# Patient Record
Sex: FEMALE | Race: BLACK OR AFRICAN AMERICAN | NOT HISPANIC OR LATINO | Employment: FULL TIME | URBAN - METROPOLITAN AREA
[De-identification: names, ages, dates, MRNs, and addresses within clinical notes are randomized per-mention and may not be internally consistent; named-entity substitution may affect disease eponyms.]

---

## 2017-01-03 ENCOUNTER — CLINICAL SUPPORT (OUTPATIENT)
Dept: OBSTETRICS AND GYNECOLOGY | Facility: CLINIC | Age: 32
End: 2017-01-03
Payer: MEDICAID

## 2017-01-03 DIAGNOSIS — Z30.42 DEPO-PROVERA CONTRACEPTIVE STATUS: Primary | ICD-10-CM

## 2017-01-03 PROCEDURE — 99999 PR PBB SHADOW E&M-NEW PATIENT-LVL I: CPT | Mod: PBBFAC,,,

## 2017-01-03 PROCEDURE — 96372 THER/PROPH/DIAG INJ SC/IM: CPT | Mod: PBBFAC,PN

## 2017-01-03 PROCEDURE — 99201 *HC E&M-NEW PATIENT-LVL I: CPT | Mod: PBBFAC,PN

## 2017-01-03 RX ORDER — MEDROXYPROGESTERONE ACETATE 150 MG/ML
150 INJECTION, SUSPENSION INTRAMUSCULAR ONCE
Status: COMPLETED | OUTPATIENT
Start: 2017-01-03 | End: 2017-01-03

## 2017-01-03 RX ADMIN — MEDROXYPROGESTERONE ACETATE 150 MG: 150 INJECTION, SUSPENSION INTRAMUSCULAR at 10:01

## 2017-01-03 NOTE — PROGRESS NOTES
Per Dr. Pérez' records, patient's last injection and annual was 10/11/16.  Depo Provera 150mg given IM without difficulty.  Patient tolerated well.  Patient instructed to remain in clinic for 15 minutes following injection.  Appointment given for next injection and AVS handed to patient.  Patient verbalized understanding of instructions.

## 2017-01-03 NOTE — MR AVS SNAPSHOT
[facility] Obstetrics and Gynecology  [address]  [place] [address]  Phone: [phone]                  Missy Grayson   1/3/2017 10:00 AM   Clinical Support    Description:  Female : 1985   Provider:  OB GYN NURSEMAYNOR   Department:  [facility] Obstetrics and Gynecology           Reason for Visit     Injections                To Do List           Future Appointments        Provider Department Dept Phone    1/3/2017 10:00 AM OB GYN NURSE, [facility] Obstetrics and Gynecology [phone]    3/28/2017 8:30 AM OB GYN NURSE, [facility] Obstetrics [facility] Gynecology [phone]      Goals (5 Years of Data)     None      [facility] On [facility]     [facility] Line -  Assistance  Registered nurses in the [facility] Center provide clinical advisement, health education, appointment booking, and other advisory services.  Call for this free service at [phone].             Medications           Message regarding Medications     Verify the changes and/or additions to your medication regime listed below are the same as discussed with your clinician today.  If any of these changes or additions are incorrect, please notify your healthcare provider.             Verify that the below list of medications is an accurate representation of the medications you are currently taking.  If none reported, the list may be blank. If incorrect, please contact your healthcare provider. Carry this list with you in case of emergency.                Clinical Reference Information           Allergies as of 1/3/2017     No Known Allergies      Immunizations Administered on Date of Encounter - 1/3/2017     None      [facility] Sign-Up     Activating your [facility] account is as easy as 1-2-3!     1) Visit [email or web address], select Sign Up Now, enter this activation code and your date of birth, then select Next.  [email or web address]  Expires: 2017  9:16 AM      2) Create a username and password  to use when you visit [facility] in the future and select a security question in case you lose your password and select Next.    3) Enter your e-mail address and click Sign Up!    Additional Information  If you have questions, please e-mail [email or web address] or call [phone] to talk to our [facility] staff. Remember, [facility] is NOT to be used for urgent needs. For medical emergencies, dial 911.

## 2017-03-28 ENCOUNTER — CLINICAL SUPPORT (OUTPATIENT)
Dept: OBSTETRICS AND GYNECOLOGY | Facility: CLINIC | Age: 32
End: 2017-03-28
Payer: MEDICAID

## 2017-03-28 DIAGNOSIS — Z30.42 ENCOUNTER FOR SURVEILLANCE OF INJECTABLE CONTRACEPTIVE: Primary | ICD-10-CM

## 2017-03-28 PROCEDURE — 96372 THER/PROPH/DIAG INJ SC/IM: CPT | Mod: PBBFAC,PN

## 2017-03-28 RX ORDER — MEDROXYPROGESTERONE ACETATE 150 MG/ML
150 INJECTION, SUSPENSION INTRAMUSCULAR
Status: SHIPPED | OUTPATIENT
Start: 2017-03-28 | End: 2018-06-21

## 2017-03-28 RX ADMIN — MEDROXYPROGESTERONE ACETATE 150 MG: 150 INJECTION, SUSPENSION INTRAMUSCULAR at 10:03

## 2017-03-28 NOTE — PROGRESS NOTES
Depo-Provera 150mg given IM as ordered by . Pt tolerated injection well. Nurse visit is 12 weeks scheduled.

## 2017-04-13 ENCOUNTER — LAB VISIT (OUTPATIENT)
Dept: LAB | Facility: HOSPITAL | Age: 32
End: 2017-04-13
Attending: OBSTETRICS & GYNECOLOGY
Payer: MEDICAID

## 2017-04-13 ENCOUNTER — OFFICE VISIT (OUTPATIENT)
Dept: OBSTETRICS AND GYNECOLOGY | Facility: CLINIC | Age: 32
End: 2017-04-13
Payer: MEDICAID

## 2017-04-13 VITALS — BODY MASS INDEX: 45.99 KG/M2 | WEIGHT: 293 LBS | HEIGHT: 67 IN

## 2017-04-13 DIAGNOSIS — R53.82 CHRONIC FATIGUE: ICD-10-CM

## 2017-04-13 DIAGNOSIS — Z12.4 SCREENING FOR CERVICAL CANCER: ICD-10-CM

## 2017-04-13 DIAGNOSIS — Z01.419 ENCOUNTER FOR GYNECOLOGICAL EXAMINATION (GENERAL) (ROUTINE) WITHOUT ABNORMAL FINDINGS: Primary | ICD-10-CM

## 2017-04-13 LAB
25(OH)D3+25(OH)D2 SERPL-MCNC: 14 NG/ML
ALBUMIN SERPL BCP-MCNC: 3.3 G/DL
ALP SERPL-CCNC: 52 U/L
ALT SERPL W/O P-5'-P-CCNC: 22 U/L
ANION GAP SERPL CALC-SCNC: 12 MMOL/L
AST SERPL-CCNC: 21 U/L
BILIRUB SERPL-MCNC: 0.5 MG/DL
BUN SERPL-MCNC: 10 MG/DL
CALCIUM SERPL-MCNC: 9.3 MG/DL
CHLORIDE SERPL-SCNC: 110 MMOL/L
CO2 SERPL-SCNC: 24 MMOL/L
CREAT SERPL-MCNC: 0.8 MG/DL
ERYTHROCYTE [DISTWIDTH] IN BLOOD BY AUTOMATED COUNT: 17.1 %
EST. GFR  (AFRICAN AMERICAN): >60 ML/MIN/1.73 M^2
EST. GFR  (NON AFRICAN AMERICAN): >60 ML/MIN/1.73 M^2
GLUCOSE SERPL-MCNC: 122 MG/DL
HCT VFR BLD AUTO: 42.7 %
HGB BLD-MCNC: 13.5 G/DL
MCH RBC QN AUTO: 24.2 PG
MCHC RBC AUTO-ENTMCNC: 31.6 %
MCV RBC AUTO: 77 FL
PLATELET # BLD AUTO: 289 K/UL
PMV BLD AUTO: 10.5 FL
POTASSIUM SERPL-SCNC: 3.5 MMOL/L
PROT SERPL-MCNC: 6.6 G/DL
RBC # BLD AUTO: 5.58 M/UL
SODIUM SERPL-SCNC: 146 MMOL/L
T3FREE SERPL-MCNC: 2.7 PG/ML
T4 SERPL-MCNC: 6.4 UG/DL
TSH SERPL DL<=0.005 MIU/L-ACNC: 1.12 UIU/ML
WBC # BLD AUTO: 11.92 K/UL

## 2017-04-13 PROCEDURE — 99999 PR PBB SHADOW E&M-EST. PATIENT-LVL II: CPT | Mod: PBBFAC,,, | Performed by: OBSTETRICS & GYNECOLOGY

## 2017-04-13 PROCEDURE — 80053 COMPREHEN METABOLIC PANEL: CPT

## 2017-04-13 PROCEDURE — 82607 VITAMIN B-12: CPT

## 2017-04-13 PROCEDURE — 84436 ASSAY OF TOTAL THYROXINE: CPT

## 2017-04-13 PROCEDURE — 86376 MICROSOMAL ANTIBODY EACH: CPT

## 2017-04-13 PROCEDURE — 36415 COLL VENOUS BLD VENIPUNCTURE: CPT | Mod: PO

## 2017-04-13 PROCEDURE — 88175 CYTOPATH C/V AUTO FLUID REDO: CPT

## 2017-04-13 PROCEDURE — 85027 COMPLETE CBC AUTOMATED: CPT

## 2017-04-13 PROCEDURE — 84443 ASSAY THYROID STIM HORMONE: CPT

## 2017-04-13 PROCEDURE — 82306 VITAMIN D 25 HYDROXY: CPT

## 2017-04-13 PROCEDURE — 84481 FREE ASSAY (FT-3): CPT

## 2017-04-13 PROCEDURE — 99395 PREV VISIT EST AGE 18-39: CPT | Mod: S$PBB,,, | Performed by: OBSTETRICS & GYNECOLOGY

## 2017-04-13 NOTE — PROGRESS NOTES
Subjective:       Patient ID: Missy Grayson is a 32 y.o. female.    Chief Complaint:  Well Woman      History of Present Illness  HPI  Annual Exam-Premenopausal  Patient presents for annual exam. The patient is sexually active--female partner. GYN screening history: last pap: approximate date  and was normal. The patient wears seatbelts: yes. The patient participates in regular exercise: yes--walking. Has the patient ever been transfused or tattooed?: yes. The patient reports that there is not domestic violence in her life.    Has irreg spotting the week shot is due.   C/o nausea/dizziness; and feels better after she eats        GYN & OB History  No LMP recorded. Patient has had an injection.   Date of Last Pap: No result found    OB History    Para Term  AB SAB TAB Ectopic Multiple Living   2 2              # Outcome Date GA Lbr Lyle/2nd Weight Sex Delivery Anes PTL Lv   2 Para            1 Para                   Review of Systems  Review of Systems   Constitutional: Positive for fatigue. Negative for activity change, appetite change, chills, diaphoresis, fever and unexpected weight change.   HENT: Negative for mouth sores and tinnitus.    Eyes: Negative for discharge and visual disturbance.   Respiratory: Negative for cough, shortness of breath and wheezing.    Cardiovascular: Negative for chest pain, palpitations and leg swelling.   Gastrointestinal: Negative for abdominal pain, bloating, blood in stool, constipation, diarrhea, nausea and vomiting.   Endocrine: Negative for diabetes, hair loss, hot flashes, hyperthyroidism and hypothyroidism.   Genitourinary: Negative for decreased libido, dyspareunia, dysuria, flank pain, frequency, genital sores, hematuria, menorrhagia, menstrual problem, pelvic pain, urgency, vaginal bleeding, vaginal discharge, vaginal pain, dysmenorrhea, urinary incontinence, postcoital bleeding, postmenopausal bleeding and vaginal odor.   Musculoskeletal: Negative for back  pain and myalgias.   Skin:  Negative for rash, no acne and hair changes.   Neurological: Positive for syncope. Negative for seizures, numbness and headaches.   Hematological: Negative for adenopathy. Does not bruise/bleed easily.   Psychiatric/Behavioral: Negative for depression and sleep disturbance. The patient is not nervous/anxious.    Breast: Negative for breast mass, breast pain, nipple discharge and skin changes          Objective:    Physical Exam:   Constitutional: She appears well-developed.     Eyes: Conjunctivae and EOM are normal. Pupils are equal, round, and reactive to light.    Neck: Normal range of motion. Neck supple.     Pulmonary/Chest: Effort normal. Right breast exhibits no mass, no nipple discharge, no skin change and no tenderness. Left breast exhibits no mass, no nipple discharge, no skin change and no tenderness. Breasts are symmetrical.        Abdominal: Soft.     Genitourinary: Rectum normal, vagina normal and uterus normal. Pelvic exam was performed with patient supine. Cervix is normal. Right adnexum displays no mass and no tenderness. Left adnexum displays no mass and no tenderness. No erythema, bleeding, rectocele, cystocele or unspecified prolapse of vaginal walls in the vagina. No vaginal discharge found. Labial bartholins normal.       Uterus Size: 6 cm   Musculoskeletal: Normal range of motion.       Neurological: She is alert.    Skin: Skin is warm.    Psychiatric: She has a normal mood and affect.          Assessment:         Encounter Diagnoses   Name Primary?    Chronic fatigue     Encounter for gynecological examination (general) (routine) without abnormal findings Yes    Screening for cervical cancer                 Plan:      Continue annual well woman exam.  Pap today  Osteoporosis prevention on depo  Encouraged weight loss, diet, exercise  Labs today for chronic fatigue

## 2017-04-13 NOTE — MR AVS SNAPSHOT
"    [facility] Obstetrics and Gynecology  [address]  [place] [address]  Phone: [phone]                  Missy Grayson   2017 3:00 PM   Office Visit    Description:  Female : 1985   Provider:  Mili Pérez MD   Department:  [facility] Obstetrics and Gynecology           Reason for Visit     Well Woman           Diagnoses this Visit        Comments    Chronic fatigue    -  Primary     Encounter for gynecological examination (general) (routine) without abnormal findings         Screening for cervical cancer         Bacterial vaginosis                To Do List           Future Appointments        Provider Department Dept Phone    2017 8:30 AM OB GYN NURSE, [facility] Obstetrics and Gynecology [phone]      Goals (5 Years of Data)     None      [facility]     [facility] On Call Nurse Care Line -  Assistance  Unless otherwise directed by your provider, please contact [facility] On-Call, our nurse care line that is available for  assistance.     Registered nurses in the [facility] On Call Center provide: appointment scheduling, clinical advisement, health education, and other advisory services.  Call: [phone] (toll free)               Medications           Message regarding Medications     Verify the changes and/or additions to your medication regime listed below are the same as discussed with your clinician today.  If any of these changes or additions are incorrect, please notify your healthcare provider.             Verify that the below list of medications is an accurate representation of the medications you are currently taking.  If none reported, the list may be blank. If incorrect, please contact your healthcare provider. Carry this list with you in case of emergency.                Clinical Reference Information           Your Vitals Were     Height Weight BMI          5' 7" (1.702 m) 172 kg (379 lb 3.1 oz) 59.39 kg/m2        Allergies as of 2017  "    No Known Allergies      Immunizations Administered on Date of Encounter - 4/13/2017     None      [facility] Sign-Up     Activating your [facility] account is as easy as 1-2-3!     1) Visit [email or web address], select Sign Up Now, enter this activation code and your date of birth, then select Next.  [email or web address]  Expires: 5/28/2017  3:26 PM      2) Create a username and password to use when you visit [facility] in the future and select a security question in case you lose your password and select Next.    3) Enter your e-mail address and click Sign Up!    Additional Information  If you have questions, please e-mail [email or web address] or call [phone] to talk to our [facility] staff. Remember, [facility] is NOT to be used for urgent needs. For medical emergencies, dial 911.         Smoking Cessation     If you would like to quit smoking:   You may be eligible for free services if you are a [place] resident and started smoking cigarettes before September 1, 1988.  Call the Smoking Cessation Trust ([facility]) toll free at [phone] or [phone].   Call 9-588-QUIT-NOW if you do not meet the above criteria.   Contact us via email: [email or web address]   View our website for more information: [email or web address]        Language Assistance Services     ATTENTION: Language assistance services are available, free of charge. Please call [phone].      ATENCIÓN: Si habla esauañol, tiene a kimball disposición servicios gratuitos de asistencia lingüística. Llame al [phone].     ZULMA Ý: N?u b?n nói Ti?ng Vi?t, có các d?ch v? h? tr? ngôn ng? mi?n phí dành cho b?n. G?i s? [phone].         [facility] Ob[facility] complies with applicable Federal civil rights laws and does not discriminate on the basis of race, color, national origin, age, disability, or sex.

## 2017-04-14 LAB
THYROPEROXIDASE IGG SERPL-ACNC: <6 IU/ML
VIT B12 SERPL-MCNC: 302 PG/ML

## 2017-04-18 ENCOUNTER — TELEPHONE (OUTPATIENT)
Dept: OBSTETRICS AND GYNECOLOGY | Facility: CLINIC | Age: 32
End: 2017-04-18

## 2017-04-18 DIAGNOSIS — E55.9 HYPOVITAMINOSIS D: Primary | ICD-10-CM

## 2017-04-18 NOTE — TELEPHONE ENCOUNTER
----- Message from Mili Pérez MD sent at 4/16/2017  2:05 PM CDT -----  Please advise pt, her vit b could be higher; (could get monthly b12 injection); vit d is very low; needs 10k units vit d for 6 wks; repeat lab then decrease to 5k units daily; her thyroid and hemoglobin level are ok.  The test was not fasting , but overall glucose level is normal

## 2017-04-19 NOTE — TELEPHONE ENCOUNTER
Pt was informed of abnormal labs and states she will  vit b12 and vitamin d. Her lab appt has been scheduled. FREYA workman

## 2017-04-19 NOTE — TELEPHONE ENCOUNTER
----- Message from Chanel Lam sent at 4/18/2017  4:35 PM CDT -----  Contact: pt  Pt returning a missed call, pt can be reached at [phone]///thxMW

## 2017-06-05 ENCOUNTER — LAB VISIT (OUTPATIENT)
Dept: LAB | Facility: HOSPITAL | Age: 32
End: 2017-06-05
Attending: OBSTETRICS & GYNECOLOGY
Payer: MEDICAID

## 2017-06-05 DIAGNOSIS — E55.9 HYPOVITAMINOSIS D: ICD-10-CM

## 2017-06-05 LAB — 25(OH)D3+25(OH)D2 SERPL-MCNC: 18 NG/ML

## 2017-06-05 PROCEDURE — 82306 VITAMIN D 25 HYDROXY: CPT

## 2017-06-05 PROCEDURE — 36415 COLL VENOUS BLD VENIPUNCTURE: CPT | Mod: PO

## 2017-06-12 ENCOUNTER — TELEPHONE (OUTPATIENT)
Dept: OBSTETRICS AND GYNECOLOGY | Facility: CLINIC | Age: 32
End: 2017-06-12

## 2017-06-12 DIAGNOSIS — E55.9 HYPOVITAMINOSIS D: Primary | ICD-10-CM

## 2017-06-12 NOTE — TELEPHONE ENCOUNTER
Per orders, spoke to the pt. and she advised that she is taking 1000iu of Vitamin D daily. [email or web address],lpn

## 2017-06-12 NOTE — TELEPHONE ENCOUNTER
----- Message from Mili Pérez MD sent at 6/11/2017  5:45 PM CDT -----  Please advise vit d is still very low; how much is she taking daily?

## 2017-06-27 ENCOUNTER — CLINICAL SUPPORT (OUTPATIENT)
Dept: OBSTETRICS AND GYNECOLOGY | Facility: CLINIC | Age: 32
End: 2017-06-27
Payer: MEDICAID

## 2017-06-27 DIAGNOSIS — Z30.9 ENCOUNTER FOR CONTRACEPTIVE MANAGEMENT, UNSPECIFIED TYPE: Primary | ICD-10-CM

## 2017-06-27 PROCEDURE — 96372 THER/PROPH/DIAG INJ SC/IM: CPT | Mod: PBBFAC,PN

## 2017-06-27 RX ORDER — MEDROXYPROGESTERONE ACETATE 150 MG/ML
150 INJECTION, SUSPENSION INTRAMUSCULAR
Status: SHIPPED | OUTPATIENT
Start: 2017-06-27

## 2017-06-27 RX ADMIN — MEDROXYPROGESTERONE ACETATE 150 MG: 150 INJECTION, SUSPENSION INTRAMUSCULAR at 08:06

## 2017-06-27 NOTE — PROGRESS NOTES
After identifying patient with 2 identifiers, verifying that patient wished to receive depo provera for contraception, reviewing allergies, 150 mg depo provera administered to left ventrogluteal.

## 2017-06-27 NOTE — PROGRESS NOTES
Patient tolerated well.  Patient instructed to wait 15 minutes and verbalized understanding.  Date for next injection given and appointment scheduled.  AVS printed and given to patient.

## 2017-07-24 ENCOUNTER — LAB VISIT (OUTPATIENT)
Dept: LAB | Facility: HOSPITAL | Age: 32
End: 2017-07-24
Attending: OBSTETRICS & GYNECOLOGY
Payer: MEDICAID

## 2017-07-24 DIAGNOSIS — E55.9 HYPOVITAMINOSIS D: ICD-10-CM

## 2017-07-24 LAB — 25(OH)D3+25(OH)D2 SERPL-MCNC: 33 NG/ML

## 2017-07-24 PROCEDURE — 82306 VITAMIN D 25 HYDROXY: CPT

## 2017-07-24 PROCEDURE — 36415 COLL VENOUS BLD VENIPUNCTURE: CPT | Mod: PO

## 2017-07-27 ENCOUNTER — TELEPHONE (OUTPATIENT)
Dept: OBSTETRICS AND GYNECOLOGY | Facility: CLINIC | Age: 32
End: 2017-07-27

## 2017-07-27 NOTE — TELEPHONE ENCOUNTER
Informed pt. of her results vit d only slightly improved; please continue 10k units daily. kaiden estevez

## 2017-09-25 ENCOUNTER — CLINICAL SUPPORT (OUTPATIENT)
Dept: OBSTETRICS AND GYNECOLOGY | Facility: CLINIC | Age: 32
End: 2017-09-25
Payer: MEDICAID

## 2017-09-25 DIAGNOSIS — Z30.9 ENCOUNTER FOR CONTRACEPTIVE MANAGEMENT, UNSPECIFIED TYPE: Primary | ICD-10-CM

## 2017-09-25 PROCEDURE — 96372 THER/PROPH/DIAG INJ SC/IM: CPT | Mod: PBBFAC,PN

## 2017-09-25 RX ORDER — MEDROXYPROGESTERONE ACETATE 150 MG/ML
150 INJECTION, SUSPENSION INTRAMUSCULAR ONCE
Status: COMPLETED | OUTPATIENT
Start: 2017-09-25 | End: 2017-09-25

## 2017-09-25 RX ADMIN — MEDROXYPROGESTERONE ACETATE 150 MG: 150 INJECTION, SUSPENSION INTRAMUSCULAR at 09:09

## 2017-12-26 ENCOUNTER — TELEPHONE (OUTPATIENT)
Dept: OBSTETRICS AND GYNECOLOGY | Facility: CLINIC | Age: 32
End: 2017-12-26

## 2017-12-26 NOTE — TELEPHONE ENCOUNTER
I spoke with pt she states she's still at work waiting on someone to come relieve her need to reschedule nurse visit.  Nurse visit reschedule for today at 1pm [place] location.  She voiced understanding

## 2017-12-26 NOTE — TELEPHONE ENCOUNTER
----- Message from Laura Ames sent at 12/26/2017 10:49 AM CST -----  Pt at [phone]//she missed her nurse appt this morning//is calling to reschedule//please call//rashida/padma

## 2025-07-30 ENCOUNTER — HOSPITAL ENCOUNTER (INPATIENT)
Facility: HOSPITAL | Age: 40
LOS: 1 days | Discharge: OTHER INSTITUTION | DRG: 552 | End: 2025-08-01
Attending: EMERGENCY MEDICINE | Admitting: STUDENT IN AN ORGANIZED HEALTH CARE EDUCATION/TRAINING PROGRAM
Payer: MEDICAID

## 2025-07-30 DIAGNOSIS — N30.01 ACUTE CYSTITIS WITH HEMATURIA: ICD-10-CM

## 2025-07-30 DIAGNOSIS — M25.559 HIP PAIN: ICD-10-CM

## 2025-07-30 DIAGNOSIS — M79.18 RIGHT BUTTOCK PAIN: ICD-10-CM

## 2025-07-30 DIAGNOSIS — M46.1 SACROILIITIS: Primary | ICD-10-CM

## 2025-07-30 DIAGNOSIS — R07.9 CHEST PAIN: ICD-10-CM

## 2025-07-30 DIAGNOSIS — Z13.6 SCREENING FOR CARDIOVASCULAR CONDITION: ICD-10-CM

## 2025-07-30 LAB
ABSOLUTE EOSINOPHIL (OHS): 0.24 K/UL
ABSOLUTE MONOCYTE (OHS): 0.78 K/UL (ref 0.3–1)
ABSOLUTE NEUTROPHIL COUNT (OHS): 9.84 K/UL (ref 1.8–7.7)
ALBUMIN SERPL BCP-MCNC: 3.5 G/DL (ref 3.5–5.2)
ALP SERPL-CCNC: 79 UNIT/L (ref 40–150)
ALT SERPL W/O P-5'-P-CCNC: 13 UNIT/L (ref 10–44)
ANION GAP (OHS): 13 MMOL/L (ref 8–16)
AST SERPL-CCNC: 11 UNIT/L (ref 11–45)
BACTERIA #/AREA URNS AUTO: ABNORMAL /HPF
BASOPHILS # BLD AUTO: 0.06 K/UL
BASOPHILS NFR BLD AUTO: 0.5 %
BILIRUB SERPL-MCNC: 0.3 MG/DL (ref 0.1–1)
BILIRUB UR QL STRIP.AUTO: NEGATIVE
BUN SERPL-MCNC: 20 MG/DL (ref 6–20)
CALCIUM SERPL-MCNC: 10.2 MG/DL (ref 8.7–10.5)
CHLORIDE SERPL-SCNC: 105 MMOL/L (ref 95–110)
CLARITY UR: ABNORMAL
CO2 SERPL-SCNC: 24 MMOL/L (ref 23–29)
COLOR UR AUTO: YELLOW
CREAT SERPL-MCNC: 0.8 MG/DL (ref 0.5–1.4)
ERYTHROCYTE [DISTWIDTH] IN BLOOD BY AUTOMATED COUNT: 14.9 % (ref 11.5–14.5)
ERYTHROCYTE [SEDIMENTATION RATE] IN BLOOD: 102 MM/HR
GFR SERPLBLD CREATININE-BSD FMLA CKD-EPI: >60 ML/MIN/1.73/M2
GLUCOSE SERPL-MCNC: 111 MG/DL (ref 70–110)
GLUCOSE UR QL STRIP: NEGATIVE
HCT VFR BLD AUTO: 35.4 % (ref 37–48.5)
HGB BLD-MCNC: 11.2 GM/DL (ref 12–16)
HGB UR QL STRIP: ABNORMAL
HYALINE CASTS UR QL AUTO: 0 /LPF (ref 0–1)
IMM GRANULOCYTES # BLD AUTO: 0.04 K/UL (ref 0–0.04)
IMM GRANULOCYTES NFR BLD AUTO: 0.3 % (ref 0–0.5)
KETONES UR QL STRIP: NEGATIVE
LACTATE SERPL-SCNC: 1.7 MMOL/L (ref 0.5–2.2)
LEUKOCYTE ESTERASE UR QL STRIP: ABNORMAL
LYMPHOCYTES # BLD AUTO: 2.28 K/UL (ref 1–4.8)
MCH RBC QN AUTO: 25.2 PG (ref 27–31)
MCHC RBC AUTO-ENTMCNC: 31.6 G/DL (ref 32–36)
MCV RBC AUTO: 80 FL (ref 82–98)
MICROSCOPIC COMMENT: ABNORMAL
NITRITE UR QL STRIP: POSITIVE
NUCLEATED RBC (/100WBC) (OHS): 0 /100 WBC
PH UR STRIP: 7 [PH]
PLATELET # BLD AUTO: 599 K/UL (ref 150–450)
PMV BLD AUTO: 8.7 FL (ref 9.2–12.9)
POTASSIUM SERPL-SCNC: 3.7 MMOL/L (ref 3.5–5.1)
PROCALCITONIN SERPL-MCNC: 0.02 NG/ML
PROT SERPL-MCNC: 8.7 GM/DL (ref 6–8.4)
PROT UR QL STRIP: ABNORMAL
RBC # BLD AUTO: 4.44 M/UL (ref 4–5.4)
RBC #/AREA URNS AUTO: >100 /HPF (ref 0–4)
RELATIVE EOSINOPHIL (OHS): 1.8 %
RELATIVE LYMPHOCYTE (OHS): 17.2 % (ref 18–48)
RELATIVE MONOCYTE (OHS): 5.9 % (ref 4–15)
RELATIVE NEUTROPHIL (OHS): 74.3 % (ref 38–73)
SODIUM SERPL-SCNC: 142 MMOL/L (ref 136–145)
SP GR UR STRIP: 1.03
UROBILINOGEN UR STRIP-ACNC: ABNORMAL EU/DL
WBC # BLD AUTO: 13.24 K/UL (ref 3.9–12.7)
WBC #/AREA URNS AUTO: 62 /HPF (ref 0–5)

## 2025-07-30 PROCEDURE — 80053 COMPREHEN METABOLIC PANEL: CPT

## 2025-07-30 PROCEDURE — 87040 BLOOD CULTURE FOR BACTERIA: CPT

## 2025-07-30 PROCEDURE — 82962 GLUCOSE BLOOD TEST: CPT

## 2025-07-30 PROCEDURE — 99285 EMERGENCY DEPT VISIT HI MDM: CPT | Mod: 25

## 2025-07-30 PROCEDURE — 83605 ASSAY OF LACTIC ACID: CPT

## 2025-07-30 PROCEDURE — 84145 PROCALCITONIN (PCT): CPT | Performed by: EMERGENCY MEDICINE

## 2025-07-30 PROCEDURE — 85025 COMPLETE CBC W/AUTO DIFF WBC: CPT

## 2025-07-30 PROCEDURE — 93010 ELECTROCARDIOGRAM REPORT: CPT | Mod: ,,, | Performed by: INTERNAL MEDICINE

## 2025-07-30 PROCEDURE — 85652 RBC SED RATE AUTOMATED: CPT | Performed by: EMERGENCY MEDICINE

## 2025-07-30 PROCEDURE — 87186 SC STD MICRODIL/AGAR DIL: CPT

## 2025-07-30 PROCEDURE — 81003 URINALYSIS AUTO W/O SCOPE: CPT

## 2025-07-30 PROCEDURE — 93005 ELECTROCARDIOGRAM TRACING: CPT

## 2025-07-30 RX ORDER — ASPIRIN 81 MG/1
81 TABLET ORAL EVERY MORNING
Status: ON HOLD | COMMUNITY
Start: 2025-07-21

## 2025-07-30 RX ORDER — AMLODIPINE BESYLATE 10 MG/1
TABLET ORAL
Status: ON HOLD | COMMUNITY
Start: 2025-07-21

## 2025-07-30 RX ORDER — PANTOPRAZOLE SODIUM 40 MG/1
TABLET, DELAYED RELEASE ORAL DAILY PRN
Status: ON HOLD | COMMUNITY
Start: 2025-07-28 | End: 2025-08-06 | Stop reason: CLARIF

## 2025-07-30 RX ORDER — TRAMADOL HYDROCHLORIDE 50 MG/1
TABLET, FILM COATED ORAL
Status: ON HOLD | COMMUNITY
Start: 2025-07-28 | End: 2025-08-06 | Stop reason: CLARIF

## 2025-07-30 RX ORDER — GABAPENTIN 300 MG/1
1 CAPSULE ORAL 3 TIMES DAILY
Status: ON HOLD | COMMUNITY
Start: 2025-07-21 | End: 2025-08-06 | Stop reason: CLARIF

## 2025-07-30 RX ORDER — ATORVASTATIN CALCIUM 40 MG/1
TABLET, FILM COATED ORAL
Status: ON HOLD | COMMUNITY
Start: 2025-07-21

## 2025-07-30 RX ORDER — IRBESARTAN AND HYDROCHLOROTHIAZIDE 150; 12.5 MG/1; MG/1
TABLET, FILM COATED ORAL
Status: ON HOLD | COMMUNITY
Start: 2025-07-21 | End: 2025-08-06 | Stop reason: CLARIF

## 2025-07-31 ENCOUNTER — E-CONSULT (OUTPATIENT)
Dept: RHEUMATOLOGY | Facility: CLINIC | Age: 40
End: 2025-07-31
Payer: MEDICAID

## 2025-07-31 DIAGNOSIS — M25.551 RIGHT HIP PAIN: Primary | ICD-10-CM

## 2025-07-31 PROBLEM — N20.0 RENAL CALCULUS, LEFT: Status: ACTIVE | Noted: 2025-07-12

## 2025-07-31 PROBLEM — E78.5 HYPERLIPIDEMIA: Status: ACTIVE | Noted: 2025-07-12

## 2025-07-31 PROBLEM — M46.1 SACROILIITIS: Status: ACTIVE | Noted: 2025-07-15

## 2025-07-31 PROBLEM — M25.451 RIGHT HIP JOINT EFFUSION: Status: ACTIVE | Noted: 2025-07-18

## 2025-07-31 PROBLEM — M25.48: Status: ACTIVE | Noted: 2025-07-18

## 2025-07-31 PROBLEM — I10 HYPERTENSION: Status: ACTIVE | Noted: 2025-07-12

## 2025-07-31 PROBLEM — N39.0 UTI (URINARY TRACT INFECTION): Status: ACTIVE | Noted: 2025-07-31

## 2025-07-31 PROBLEM — E11.9 TYPE 2 DIABETES MELLITUS: Status: ACTIVE | Noted: 2025-07-12

## 2025-07-31 PROBLEM — R29.898 RIGHT LEG WEAKNESS: Status: ACTIVE | Noted: 2025-07-18

## 2025-07-31 LAB
ABSOLUTE EOSINOPHIL (OHS): 0.23 K/UL
ABSOLUTE MONOCYTE (OHS): 0.57 K/UL (ref 0.3–1)
ABSOLUTE NEUTROPHIL COUNT (OHS): 7.32 K/UL (ref 1.8–7.7)
ALBUMIN SERPL BCP-MCNC: 3.6 G/DL (ref 3.5–5.2)
ALP SERPL-CCNC: 66 UNIT/L (ref 40–150)
ALT SERPL W/O P-5'-P-CCNC: 12 UNIT/L (ref 10–44)
ANION GAP (OHS): 12 MMOL/L (ref 8–16)
AST SERPL-CCNC: 14 UNIT/L (ref 11–45)
BASOPHILS # BLD AUTO: 0.04 K/UL
BASOPHILS NFR BLD AUTO: 0.4 %
BILIRUB SERPL-MCNC: 0.4 MG/DL (ref 0.1–1)
BUN SERPL-MCNC: 14 MG/DL (ref 6–20)
CALCIUM SERPL-MCNC: 10.2 MG/DL (ref 8.7–10.5)
CHLORIDE SERPL-SCNC: 105 MMOL/L (ref 95–110)
CO2 SERPL-SCNC: 23 MMOL/L (ref 23–29)
CREAT SERPL-MCNC: 0.7 MG/DL (ref 0.5–1.4)
ERYTHROCYTE [DISTWIDTH] IN BLOOD BY AUTOMATED COUNT: 14.8 % (ref 11.5–14.5)
GFR SERPLBLD CREATININE-BSD FMLA CKD-EPI: >60 ML/MIN/1.73/M2
GLUCOSE SERPL-MCNC: 117 MG/DL (ref 70–110)
HCT VFR BLD AUTO: 36 % (ref 37–48.5)
HGB BLD-MCNC: 11.5 GM/DL (ref 12–16)
IMM GRANULOCYTES # BLD AUTO: 0.04 K/UL (ref 0–0.04)
IMM GRANULOCYTES NFR BLD AUTO: 0.4 % (ref 0–0.5)
LYMPHOCYTES # BLD AUTO: 1.77 K/UL (ref 1–4.8)
MAGNESIUM SERPL-MCNC: 1.8 MG/DL (ref 1.6–2.6)
MCH RBC QN AUTO: 25.2 PG (ref 27–31)
MCHC RBC AUTO-ENTMCNC: 31.9 G/DL (ref 32–36)
MCV RBC AUTO: 79 FL (ref 82–98)
NUCLEATED RBC (/100WBC) (OHS): 0 /100 WBC
OHS QRS DURATION: 86 MS
OHS QTC CALCULATION: 435 MS
PHOSPHATE SERPL-MCNC: 3.7 MG/DL (ref 2.7–4.5)
PLATELET # BLD AUTO: 581 K/UL (ref 150–450)
PMV BLD AUTO: 8.6 FL (ref 9.2–12.9)
POCT GLUCOSE: 99 MG/DL (ref 70–110)
POTASSIUM SERPL-SCNC: 3.8 MMOL/L (ref 3.5–5.1)
PROT SERPL-MCNC: 8.5 GM/DL (ref 6–8.4)
RBC # BLD AUTO: 4.56 M/UL (ref 4–5.4)
RELATIVE EOSINOPHIL (OHS): 2.3 %
RELATIVE LYMPHOCYTE (OHS): 17.8 % (ref 18–48)
RELATIVE MONOCYTE (OHS): 5.7 % (ref 4–15)
RELATIVE NEUTROPHIL (OHS): 73.4 % (ref 38–73)
SODIUM SERPL-SCNC: 140 MMOL/L (ref 136–145)
WBC # BLD AUTO: 9.97 K/UL (ref 3.9–12.7)

## 2025-07-31 PROCEDURE — 63600175 PHARM REV CODE 636 W HCPCS: Performed by: EMERGENCY MEDICINE

## 2025-07-31 PROCEDURE — 96375 TX/PRO/DX INJ NEW DRUG ADDON: CPT

## 2025-07-31 PROCEDURE — 85025 COMPLETE CBC W/AUTO DIFF WBC: CPT | Performed by: STUDENT IN AN ORGANIZED HEALTH CARE EDUCATION/TRAINING PROGRAM

## 2025-07-31 PROCEDURE — G0378 HOSPITAL OBSERVATION PER HR: HCPCS

## 2025-07-31 PROCEDURE — 25000003 PHARM REV CODE 250: Performed by: STUDENT IN AN ORGANIZED HEALTH CARE EDUCATION/TRAINING PROGRAM

## 2025-07-31 PROCEDURE — 25000003 PHARM REV CODE 250

## 2025-07-31 PROCEDURE — 63600175 PHARM REV CODE 636 W HCPCS

## 2025-07-31 PROCEDURE — 84100 ASSAY OF PHOSPHORUS: CPT | Performed by: STUDENT IN AN ORGANIZED HEALTH CARE EDUCATION/TRAINING PROGRAM

## 2025-07-31 PROCEDURE — 96374 THER/PROPH/DIAG INJ IV PUSH: CPT

## 2025-07-31 PROCEDURE — 83735 ASSAY OF MAGNESIUM: CPT | Performed by: STUDENT IN AN ORGANIZED HEALTH CARE EDUCATION/TRAINING PROGRAM

## 2025-07-31 PROCEDURE — 25500020 PHARM REV CODE 255: Performed by: EMERGENCY MEDICINE

## 2025-07-31 PROCEDURE — 36415 COLL VENOUS BLD VENIPUNCTURE: CPT | Performed by: STUDENT IN AN ORGANIZED HEALTH CARE EDUCATION/TRAINING PROGRAM

## 2025-07-31 PROCEDURE — 63600175 PHARM REV CODE 636 W HCPCS: Performed by: STUDENT IN AN ORGANIZED HEALTH CARE EDUCATION/TRAINING PROGRAM

## 2025-07-31 PROCEDURE — 63600175 PHARM REV CODE 636 W HCPCS: Mod: JZ,TB | Performed by: EMERGENCY MEDICINE

## 2025-07-31 PROCEDURE — 80053 COMPREHEN METABOLIC PANEL: CPT | Performed by: STUDENT IN AN ORGANIZED HEALTH CARE EDUCATION/TRAINING PROGRAM

## 2025-07-31 PROCEDURE — 96376 TX/PRO/DX INJ SAME DRUG ADON: CPT

## 2025-07-31 RX ORDER — AMLODIPINE BESYLATE 10 MG/1
10 TABLET ORAL DAILY
Status: DISCONTINUED | OUTPATIENT
Start: 2025-08-01 | End: 2025-08-01 | Stop reason: HOSPADM

## 2025-07-31 RX ORDER — AMLODIPINE BESYLATE 5 MG/1
10 TABLET ORAL
Status: COMPLETED | OUTPATIENT
Start: 2025-07-31 | End: 2025-07-31

## 2025-07-31 RX ORDER — IBUPROFEN 200 MG
16 TABLET ORAL
Status: DISCONTINUED | OUTPATIENT
Start: 2025-07-31 | End: 2025-08-01 | Stop reason: HOSPADM

## 2025-07-31 RX ORDER — ERGOCALCIFEROL 1.25 MG/1
CAPSULE ORAL
Status: ON HOLD | COMMUNITY

## 2025-07-31 RX ORDER — GABAPENTIN 300 MG/1
300 CAPSULE ORAL 3 TIMES DAILY
Status: DISCONTINUED | OUTPATIENT
Start: 2025-08-01 | End: 2025-08-01 | Stop reason: HOSPADM

## 2025-07-31 RX ORDER — METHOCARBAMOL 500 MG/1
1000 TABLET, FILM COATED ORAL
Status: COMPLETED | OUTPATIENT
Start: 2025-07-31 | End: 2025-07-31

## 2025-07-31 RX ORDER — IBUPROFEN 200 MG
24 TABLET ORAL
Status: DISCONTINUED | OUTPATIENT
Start: 2025-07-31 | End: 2025-08-01 | Stop reason: HOSPADM

## 2025-07-31 RX ORDER — PROCHLORPERAZINE EDISYLATE 5 MG/ML
2.5 INJECTION INTRAMUSCULAR; INTRAVENOUS EVERY 8 HOURS PRN
Status: DISCONTINUED | OUTPATIENT
Start: 2025-07-31 | End: 2025-08-01 | Stop reason: HOSPADM

## 2025-07-31 RX ORDER — ATORVASTATIN CALCIUM 40 MG/1
40 TABLET, FILM COATED ORAL NIGHTLY
Status: DISCONTINUED | OUTPATIENT
Start: 2025-07-31 | End: 2025-08-01 | Stop reason: HOSPADM

## 2025-07-31 RX ORDER — HYDROCODONE BITARTRATE AND ACETAMINOPHEN 5; 325 MG/1; MG/1
1 TABLET ORAL EVERY 4 HOURS PRN
Refills: 0 | Status: DISCONTINUED | OUTPATIENT
Start: 2025-07-31 | End: 2025-08-01

## 2025-07-31 RX ORDER — MORPHINE SULFATE 4 MG/ML
3 INJECTION, SOLUTION INTRAMUSCULAR; INTRAVENOUS EVERY 4 HOURS PRN
Status: DISCONTINUED | OUTPATIENT
Start: 2025-07-31 | End: 2025-08-01

## 2025-07-31 RX ORDER — LANOLIN ALCOHOL/MO/W.PET/CERES
1 CREAM (GRAM) TOPICAL DAILY
Status: DISCONTINUED | OUTPATIENT
Start: 2025-08-01 | End: 2025-08-01 | Stop reason: HOSPADM

## 2025-07-31 RX ORDER — FERROUS SULFATE 325(65) MG
TABLET ORAL 2 TIMES DAILY
Status: ON HOLD | COMMUNITY
Start: 2025-06-24

## 2025-07-31 RX ORDER — ACETAMINOPHEN 500 MG
1000 TABLET ORAL EVERY 6 HOURS SCHEDULED
Status: DISCONTINUED | OUTPATIENT
Start: 2025-07-31 | End: 2025-08-01 | Stop reason: HOSPADM

## 2025-07-31 RX ORDER — TALC
6 POWDER (GRAM) TOPICAL NIGHTLY PRN
Status: DISCONTINUED | OUTPATIENT
Start: 2025-07-31 | End: 2025-08-01 | Stop reason: HOSPADM

## 2025-07-31 RX ORDER — NALOXONE HCL 0.4 MG/ML
0.02 VIAL (ML) INJECTION
Status: DISCONTINUED | OUTPATIENT
Start: 2025-07-31 | End: 2025-08-01 | Stop reason: HOSPADM

## 2025-07-31 RX ORDER — HYDROMORPHONE HYDROCHLORIDE 1 MG/ML
1 INJECTION, SOLUTION INTRAMUSCULAR; INTRAVENOUS; SUBCUTANEOUS
Status: COMPLETED | OUTPATIENT
Start: 2025-07-31 | End: 2025-07-31

## 2025-07-31 RX ORDER — ONDANSETRON HYDROCHLORIDE 2 MG/ML
4 INJECTION, SOLUTION INTRAVENOUS EVERY 8 HOURS PRN
Status: DISCONTINUED | OUTPATIENT
Start: 2025-07-31 | End: 2025-08-01 | Stop reason: HOSPADM

## 2025-07-31 RX ORDER — HYDROMORPHONE HYDROCHLORIDE 1 MG/ML
1 INJECTION, SOLUTION INTRAMUSCULAR; INTRAVENOUS; SUBCUTANEOUS
Refills: 0 | Status: COMPLETED | OUTPATIENT
Start: 2025-07-31 | End: 2025-07-31

## 2025-07-31 RX ORDER — METFORMIN HYDROCHLORIDE 1000 MG/1
1000 TABLET ORAL 2 TIMES DAILY WITH MEALS
Status: ON HOLD | COMMUNITY
Start: 2025-06-24

## 2025-07-31 RX ORDER — GLUCAGON 1 MG
1 KIT INJECTION
Status: DISCONTINUED | OUTPATIENT
Start: 2025-07-31 | End: 2025-08-01 | Stop reason: HOSPADM

## 2025-07-31 RX ORDER — KETOROLAC TROMETHAMINE 30 MG/ML
30 INJECTION, SOLUTION INTRAMUSCULAR; INTRAVENOUS
Status: COMPLETED | OUTPATIENT
Start: 2025-07-31 | End: 2025-07-31

## 2025-07-31 RX ORDER — OXYCODONE HYDROCHLORIDE 5 MG/1
10 TABLET ORAL EVERY 4 HOURS PRN
Refills: 0 | Status: DISCONTINUED | OUTPATIENT
Start: 2025-07-31 | End: 2025-07-31

## 2025-07-31 RX ORDER — POLYETHYLENE GLYCOL 3350 17 G/17G
17 POWDER, FOR SOLUTION ORAL 2 TIMES DAILY PRN
Status: DISCONTINUED | OUTPATIENT
Start: 2025-07-31 | End: 2025-08-01 | Stop reason: HOSPADM

## 2025-07-31 RX ORDER — HYDROMORPHONE HYDROCHLORIDE 1 MG/ML
1 INJECTION, SOLUTION INTRAMUSCULAR; INTRAVENOUS; SUBCUTANEOUS
Refills: 0 | Status: DISCONTINUED | OUTPATIENT
Start: 2025-07-31 | End: 2025-07-31

## 2025-07-31 RX ORDER — MORPHINE SULFATE 4 MG/ML
4 INJECTION, SOLUTION INTRAMUSCULAR; INTRAVENOUS
Refills: 0 | Status: COMPLETED | OUTPATIENT
Start: 2025-07-31 | End: 2025-07-31

## 2025-07-31 RX ORDER — ACETAMINOPHEN 325 MG/1
650 TABLET ORAL EVERY 6 HOURS PRN
Status: DISCONTINUED | OUTPATIENT
Start: 2025-07-31 | End: 2025-07-31

## 2025-07-31 RX ORDER — GABAPENTIN 100 MG/1
100 CAPSULE ORAL 3 TIMES DAILY
Status: DISCONTINUED | OUTPATIENT
Start: 2025-07-31 | End: 2025-07-31

## 2025-07-31 RX ORDER — ACETAMINOPHEN 325 MG/1
650 TABLET ORAL EVERY 4 HOURS PRN
Status: DISCONTINUED | OUTPATIENT
Start: 2025-07-31 | End: 2025-08-01

## 2025-07-31 RX ORDER — TAMSULOSIN HYDROCHLORIDE 0.4 MG/1
0.4 CAPSULE ORAL DAILY
Status: DISCONTINUED | OUTPATIENT
Start: 2025-08-01 | End: 2025-08-01 | Stop reason: HOSPADM

## 2025-07-31 RX ORDER — OXYCODONE AND ACETAMINOPHEN 10; 325 MG/1; MG/1
1 TABLET ORAL
Refills: 0 | Status: COMPLETED | OUTPATIENT
Start: 2025-07-31 | End: 2025-07-31

## 2025-07-31 RX ORDER — GLIPIZIDE 10 MG/1
TABLET ORAL
Status: ON HOLD | COMMUNITY
Start: 2025-07-21

## 2025-07-31 RX ORDER — CEFTRIAXONE 1 G/1
1 INJECTION, POWDER, FOR SOLUTION INTRAMUSCULAR; INTRAVENOUS
Status: COMPLETED | OUTPATIENT
Start: 2025-07-31 | End: 2025-07-31

## 2025-07-31 RX ORDER — MORPHINE SULFATE 4 MG/ML
3 INJECTION, SOLUTION INTRAMUSCULAR; INTRAVENOUS EVERY 6 HOURS PRN
Refills: 0 | Status: DISCONTINUED | OUTPATIENT
Start: 2025-07-31 | End: 2025-07-31

## 2025-07-31 RX ORDER — SODIUM CHLORIDE 0.9 % (FLUSH) 0.9 %
10 SYRINGE (ML) INJECTION EVERY 12 HOURS PRN
Status: DISCONTINUED | OUTPATIENT
Start: 2025-07-31 | End: 2025-08-01 | Stop reason: HOSPADM

## 2025-07-31 RX ORDER — OXYCODONE HYDROCHLORIDE 5 MG/1
5 TABLET ORAL EVERY 4 HOURS PRN
Refills: 0 | Status: DISCONTINUED | OUTPATIENT
Start: 2025-07-31 | End: 2025-07-31

## 2025-07-31 RX ORDER — METFORMIN HYDROCHLORIDE 1000 MG/1
TABLET ORAL
Status: ON HOLD | COMMUNITY
End: 2025-08-06 | Stop reason: CLARIF

## 2025-07-31 RX ORDER — GABAPENTIN 100 MG/1
200 CAPSULE ORAL ONCE
Status: COMPLETED | OUTPATIENT
Start: 2025-07-31 | End: 2025-07-31

## 2025-07-31 RX ORDER — HYDRALAZINE HYDROCHLORIDE 20 MG/ML
5 INJECTION INTRAMUSCULAR; INTRAVENOUS EVERY 6 HOURS PRN
Status: DISCONTINUED | OUTPATIENT
Start: 2025-07-31 | End: 2025-08-01 | Stop reason: HOSPADM

## 2025-07-31 RX ORDER — HYDROCODONE BITARTRATE AND ACETAMINOPHEN 5; 325 MG/1; MG/1
1 TABLET ORAL EVERY 6 HOURS PRN
Refills: 0 | Status: DISCONTINUED | OUTPATIENT
Start: 2025-07-31 | End: 2025-07-31

## 2025-07-31 RX ADMIN — ATORVASTATIN CALCIUM 40 MG: 40 TABLET, FILM COATED ORAL at 10:07

## 2025-07-31 RX ADMIN — MORPHINE SULFATE 3 MG: 4 INJECTION INTRAVENOUS at 10:07

## 2025-07-31 RX ADMIN — HYDROMORPHONE HYDROCHLORIDE 1 MG: 1 INJECTION, SOLUTION INTRAMUSCULAR; INTRAVENOUS; SUBCUTANEOUS at 12:07

## 2025-07-31 RX ADMIN — GABAPENTIN 100 MG: 100 CAPSULE ORAL at 03:07

## 2025-07-31 RX ADMIN — MORPHINE SULFATE 3 MG: 4 INJECTION INTRAVENOUS at 05:07

## 2025-07-31 RX ADMIN — IOHEXOL 100 ML: 350 INJECTION, SOLUTION INTRAVENOUS at 03:07

## 2025-07-31 RX ADMIN — HYDROMORPHONE HYDROCHLORIDE 1 MG: 1 INJECTION, SOLUTION INTRAMUSCULAR; INTRAVENOUS; SUBCUTANEOUS at 08:07

## 2025-07-31 RX ADMIN — HYDROMORPHONE HYDROCHLORIDE 1 MG: 1 INJECTION, SOLUTION INTRAMUSCULAR; INTRAVENOUS; SUBCUTANEOUS at 05:07

## 2025-07-31 RX ADMIN — ACETAMINOPHEN 1000 MG: 500 TABLET ORAL at 03:07

## 2025-07-31 RX ADMIN — MORPHINE SULFATE 4 MG: 4 INJECTION INTRAVENOUS at 03:07

## 2025-07-31 RX ADMIN — GABAPENTIN 100 MG: 100 CAPSULE ORAL at 10:07

## 2025-07-31 RX ADMIN — Medication 6 MG: at 10:07

## 2025-07-31 RX ADMIN — KETOROLAC TROMETHAMINE 30 MG: 30 INJECTION, SOLUTION INTRAMUSCULAR at 12:07

## 2025-07-31 RX ADMIN — GABAPENTIN 200 MG: 100 CAPSULE ORAL at 10:07

## 2025-07-31 RX ADMIN — OXYCODONE HYDROCHLORIDE 10 MG: 5 TABLET ORAL at 03:07

## 2025-07-31 RX ADMIN — METHOCARBAMOL 1000 MG: 500 TABLET ORAL at 11:07

## 2025-07-31 RX ADMIN — CEFTRIAXONE 1 G: 1 INJECTION, POWDER, FOR SOLUTION INTRAMUSCULAR; INTRAVENOUS at 02:07

## 2025-07-31 RX ADMIN — OXYCODONE AND ACETAMINOPHEN 1 TABLET: 10; 325 TABLET ORAL at 12:07

## 2025-07-31 RX ADMIN — AMLODIPINE BESYLATE 10 MG: 5 TABLET ORAL at 09:07

## 2025-07-31 NOTE — ED NOTES
Report received from JUDY Campos Care assumed at this time. Patient resting quietly in bed. Vital signs stable, bed locked, in lowest position, call light in reach.

## 2025-07-31 NOTE — ED PROVIDER NOTES
Emergency Medicine Provider Note - 2025    SCRIBE NOTE: I, Radha Valadez, am scribing for, and in the presence of Zaria Jacob DO.    SCRIBE #2 NOTE: I, Jayme Del Valle, am scribing for, and in the presence of,  Stew Gtz MD. I have scribed the remaining portions of the note not scribed by Scribe #1.     SCRIBE #3 NOTE: I, Stella Saul, am scribing for, and in the presence of, Jillian Aburto DO. I have scribed the remaining portions of the note not scribed by Scribe #1 and #2.    SCRIBE #4 NOTE: I, Heaven Del Valle, am scribing for, and in the presence of, Leah Montano DO. I have scribed the remaining portions of the note not scribed by Scribe #1, #2, and #3.     History     Chief Complaint   Patient presents with    Joint Infection     Pt was being treated for infection in SI Joint on the . Pt reports an increase in pain, fever, and continuation of swelling and discomfort.        Allergies:  Review of patient's allergies indicates:  No Known Allergies    History of Present Illness   HPI    2025, 12:16 AM  The history is provided by the family member and patient    Missy Grayson is a 40 y.o. female presenting to the ED for worsening right sided hip pain that radiates down her leg. Pt was seen at [facility]  and diagnosed with SI joint infection. Pt reports that her stabbing pain has immobilized her. Per pt's family member pt has been on antibiotics with no relief. Pt notes that she was given ibuprofen and Oxycodone with short lasting relief. Associated symptoms include fever that onset Monday. Pt denies any CP or SOB.       Arrival mode: Private Vehicle     PCP: No, Primary Doctor     Past Medical History:  Past Medical History:   Diagnosis Date    Diabetes mellitus     Hypertension        Past Surgical History:  Past Surgical History:   Procedure Laterality Date     SECTION           Family History:  Family History   Problem Relation Name Age of Onset    Hypertension Father          Social History:  Social History     Tobacco Use    Smoking status: Every Day    Smokeless tobacco: Not on file   Vaping Use    Vaping status: Never Used   Substance and Sexual Activity    Alcohol use: No    Drug use: No    Sexual activity: Yes     Partners: Female       I have reviewed the Past Medical History, Past Surgical History, Family History and Social History as documented above.      Review of Systems   Review of Systems   Constitutional:  Positive for fever.   HENT:  Negative for sore throat.    Respiratory:  Negative for shortness of breath.    Cardiovascular:  Negative for chest pain.   Gastrointestinal:  Negative for nausea.   Musculoskeletal:  Positive for arthralgias (Righth ip) and joint swelling. Negative for back pain.   Skin:  Negative for rash.   Neurological:  Negative for weakness.   Hematological:  Does not bruise/bleed easily.   All other systems reviewed and are negative.     Physical Exam     Initial Vitals [07/30/25 2155]   BP Pulse Resp Temp SpO2   (!) 164/97 93 20 99.2 °F (37.3 °C) 99 %      MAP       --          Physical Exam    Nursing Notes and Vital Signs Reviewed.  Constitutional: Patient is in mild distress. Well-developed and well-nourished.  Head: Atraumatic. Normocephalic.  Eyes: PERRL. EOM intact. Conjunctivae are not pale. No scleral icterus.  ENT: Mucous membranes are moist. Oropharynx is clear and symmetric.    Neck: Supple. Full ROM. No lymphadenopathy.  Cardiovascular: Regular rate. Regular rhythm. No murmurs, rubs, or gallops. Distal pulses are 2+ and symmetric.  Pulmonary/Chest: No respiratory distress. Clear to auscultation bilaterally. No wheezing or rales.  Abdominal: Soft and non-distended.  There is no tenderness.  No rebound, guarding, or rigidity.   Genitourinary: No CVA tenderness bilaterally  Musculoskeletal: Tenderness to palpation of right buttocks. Limited ROM to right leg internally and externally.  Skin: Warm and dry.  Neurological:  Alert, awake, and  "appropriate.  Normal speech.  No acute focal neurological deficits are appreciated.  Psychiatric: Tearful. Crying.      ED Course   ED Procedures:  Procedures    ED Vital Signs:  Vitals:    07/31/25 0600 07/31/25 0700 07/31/25 0800 07/31/25 0859   BP: 129/65 (!) 146/75 139/70    Pulse: 73 74 71    Resp: 17 20 14 18   Temp:       SpO2: 95% 100% 100%    Weight:       Height:        07/31/25 0936 07/31/25 1000 07/31/25 1100 07/31/25 1216   BP: (!) 154/75 130/65 127/77    Pulse: 70 78 72    Resp: 18 20 13 18   Temp: 98.9 °F (37.2 °C)      SpO2: 99% 99% 97%    Weight:       Height:        07/31/25 1541 07/31/25 1545 07/31/25 1716 07/31/25 1720   BP:  (!) 139/97     Pulse:  78     Resp: 18 20 18    Temp:       SpO2:  100%     Weight:    (!) 165.1 kg (364 lb)   Height:    5' 7" (1.702 m)    07/31/25 1724 07/31/25 2139 07/31/25 2217   BP: 138/64     Pulse: 70 91    Resp: 15  20   Temp:      SpO2: 100%     Weight:      Height:          All Lab Results:  Results for orders placed or performed during the hospital encounter of 07/30/25   Blood culture x two cultures. Draw prior to antibiotics.    Collection Time: 07/30/25 10:13 PM    Specimen: Peripheral, Antecubital, Right; Blood   Result Value Ref Range    Blood Culture No Growth After 6 Hours    Comprehensive metabolic panel    Collection Time: 07/30/25 10:13 PM   Result Value Ref Range    Sodium 142 136 - 145 mmol/L    Potassium 3.7 3.5 - 5.1 mmol/L    Chloride 105 95 - 110 mmol/L    CO2 24 23 - 29 mmol/L    Glucose 111 (H) 70 - 110 mg/dL    BUN 20 6 - 20 mg/dL    Creatinine 0.8 0.5 - 1.4 mg/dL    Calcium 10.2 8.7 - 10.5 mg/dL    Protein Total 8.7 (H) 6.0 - 8.4 gm/dL    Albumin 3.5 3.5 - 5.2 g/dL    Bilirubin Total 0.3 0.1 - 1.0 mg/dL    ALP 79 40 - 150 unit/L    AST 11 11 - 45 unit/L    ALT 13 10 - 44 unit/L    Anion Gap 13 8 - 16 mmol/L    eGFR >60 >60 mL/min/1.73/m2   CBC with Differential    Collection Time: 07/30/25 10:13 PM   Result Value Ref Range    WBC 13.24 (H) " 3.90 - 12.70 K/uL    RBC 4.44 4.00 - 5.40 M/uL    HGB 11.2 (L) 12.0 - 16.0 gm/dL    HCT 35.4 (L) 37.0 - 48.5 %    MCV 80 (L) 82 - 98 fL    MCH 25.2 (L) 27.0 - 31.0 pg    MCHC 31.6 (L) 32.0 - 36.0 g/dL    RDW 14.9 (H) 11.5 - 14.5 %    Platelet Count 599 (H) 150 - 450 K/uL    MPV 8.7 (L) 9.2 - 12.9 fL    Nucleated RBC 0 <=0 /100 WBC    Neut % 74.3 (H) 38 - 73 %    Lymph % 17.2 (L) 18 - 48 %    Mono % 5.9 4 - 15 %    Eos % 1.8 <=8 %    Basophil % 0.5 <=1.9 %    Imm Grans % 0.3 0.0 - 0.5 %    Neut # 9.84 (H) 1.8 - 7.7 K/uL    Lymph # 2.28 1 - 4.8 K/uL    Mono # 0.78 0.3 - 1 K/uL    Eos # 0.24 <=0.5 K/uL    Baso # 0.06 <=0.2 K/uL    Imm Grans # 0.04 0.00 - 0.04 K/uL   EKG 12-lead    Collection Time: 07/30/25 10:13 PM   Result Value Ref Range    QRS Duration 86 ms    OHS QTC Calculation 435 ms   Blood culture x two cultures. Draw prior to antibiotics.    Collection Time: 07/30/25 10:16 PM    Specimen: Peripheral, Upper Arm, Right; Blood   Result Value Ref Range    Blood Culture No Growth After 6 Hours    Procalcitonin    Collection Time: 07/30/25 10:21 PM   Result Value Ref Range    Procalcitonin 0.02 <0.25 ng/mL   Sedimentation rate    Collection Time: 07/30/25 10:21 PM   Result Value Ref Range    Sed Rate 102 (H) <=36 mm/hr   Lactic acid, plasma #1    Collection Time: 07/30/25 10:49 PM   Result Value Ref Range    Lactic Acid Level 1.7 0.5 - 2.2 mmol/L   Urinalysis, Reflex to Urine Culture Urine, Clean Catch    Collection Time: 07/30/25 11:21 PM    Specimen: Urine   Result Value Ref Range    Color, UA Yellow Straw, Karena, Yellow, Light-Orange    Appearance, UA Hazy (A) Clear    pH, UA 7.0 5.0 - 8.0    Spec Grav UA 1.030 1.005 - 1.030    Protein, UA 1+ (A) Negative    Glucose, UA Negative Negative    Ketones, UA Negative Negative    Bilirubin, UA Negative Negative    Blood, UA 3+ (A) Negative    Nitrites, UA Positive (A) Negative    Urobilinogen, UA 2.0-3.0 (A) <2.0 EU/dL    Leukocyte Esterase, UA 3+ (A) Negative   GREY  TOP URINE HOLD    Collection Time: 07/30/25 11:21 PM   Result Value Ref Range    Extra Tube Hold for add-ons.    Urinalysis Microscopic    Collection Time: 07/30/25 11:21 PM   Result Value Ref Range    RBC, UA >100 (H) 0 - 4 /HPF    WBC, UA 62 (H) 0 - 5 /HPF    Bacteria, UA Moderate (A) None, Rare, Occasional /HPF    Hyaline Casts, UA 0 0 - 1 /LPF    Microscopic Comment     POCT glucose    Collection Time: 07/31/25  9:30 AM   Result Value Ref Range    POCT Glucose 99 70 - 110 mg/dL   Comprehensive metabolic panel    Collection Time: 07/31/25  9:27 PM   Result Value Ref Range    Sodium 140 136 - 145 mmol/L    Potassium 3.8 3.5 - 5.1 mmol/L    Chloride 105 95 - 110 mmol/L    CO2 23 23 - 29 mmol/L    Glucose 117 (H) 70 - 110 mg/dL    BUN 14 6 - 20 mg/dL    Creatinine 0.7 0.5 - 1.4 mg/dL    Calcium 10.2 8.7 - 10.5 mg/dL    Protein Total 8.5 (H) 6.0 - 8.4 gm/dL    Albumin 3.6 3.5 - 5.2 g/dL    Bilirubin Total 0.4 0.1 - 1.0 mg/dL    ALP 66 40 - 150 unit/L    AST 14 11 - 45 unit/L    ALT 12 10 - 44 unit/L    Anion Gap 12 8 - 16 mmol/L    eGFR >60 >60 mL/min/1.73/m2   Magnesium    Collection Time: 07/31/25  9:27 PM   Result Value Ref Range    Magnesium  1.8 1.6 - 2.6 mg/dL   Phosphorus    Collection Time: 07/31/25  9:27 PM   Result Value Ref Range    Phosphorus Level 3.7 2.7 - 4.5 mg/dL   CBC with Differential    Collection Time: 07/31/25  9:27 PM   Result Value Ref Range    WBC 9.97 3.90 - 12.70 K/uL    RBC 4.56 4.00 - 5.40 M/uL    HGB 11.5 (L) 12.0 - 16.0 gm/dL    HCT 36.0 (L) 37.0 - 48.5 %    MCV 79 (L) 82 - 98 fL    MCH 25.2 (L) 27.0 - 31.0 pg    MCHC 31.9 (L) 32.0 - 36.0 g/dL    RDW 14.8 (H) 11.5 - 14.5 %    Platelet Count 581 (H) 150 - 450 K/uL    MPV 8.6 (L) 9.2 - 12.9 fL    Nucleated RBC 0 <=0 /100 WBC    Neut % 73.4 (H) 38 - 73 %    Lymph % 17.8 (L) 18 - 48 %    Mono % 5.7 4 - 15 %    Eos % 2.3 <=8 %    Basophil % 0.4 <=1.9 %    Imm Grans % 0.4 0.0 - 0.5 %    Neut # 7.32 1.8 - 7.7 K/uL    Lymph # 1.77 1 - 4.8 K/uL     Mono # 0.57 0.3 - 1 K/uL    Eos # 0.23 <=0.5 K/uL    Baso # 0.04 <=0.2 K/uL    Imm Grans # 0.04 0.00 - 0.04 K/uL     The EKG was ordered, reviewed, and independently interpreted by the ED provider.  Interpretation time: 22:13  Rate: 88 BPM  Rhythm: normal sinus rhythm  Interpretation: Minimal voltage criteria for LVH, may be normal variant (R in aVL). Cannot rule out Anterior infarct. No STEMI.      ECG Results              EKG 12-lead (Final result)        Collection Time Result Time QRS Duration OHS QTC Calculation    07/30/25 22:13:45 07/31/25 21:53:08 86 435                     Final result by Interface, Lab In [facility] (07/31/25 21:53:13)                   Narrative:    Test Reason : Z13.6,    Vent. Rate :  88 BPM     Atrial Rate :  88 BPM     P-R Int : 174 ms          QRS Dur :  86 ms      QT Int : 360 ms       P-R-T Axes :  31  -5  10 degrees    QTcB Int : 435 ms    Normal sinus rhythm  Minimal voltage criteria for LVH, may be normal variant ( R in aVL )  Cannot rule out Anterior infarct ,age undetermined  Abnormal ECG  No previous ECGs available  Confirmed by Carlos Rodriguez ([id number]) on 7/31/2025 9:53:03 PM    Referred By: AAAREFERRAL SELF           Confirmed By: Carlos Rodriguez                                    Imaging Results:  Imaging Results              MRI Hip Without Contrast Right (Final result)  Result time 07/31/25 21:35:35      Final result by Taj Duran DO (07/31/25 21:35:35)                   Impression:     Infectious sacroiliitis on the right as described above.    Finalized on: 7/31/2025 9:35 PM By:  Taj Duran  [facility]# [id number]      2025-07-31 21:37:44.188     [facility]               Narrative:    EXAM: MRI HIP WITHOUT CONTRAST RIGHT    CLINICAL HISTORY: Septic arthritis    COMPARISON: CT scan same date    TECHNIQUE: Multiplanar multi-sequential images were acquired of the pelvis.    FINDINGS: There is extensive T2 hyperintensity within the right side of the sacrum and to a lesser degree within  the adjacent iliac bone, crossing the SI joint.  Seen best on series 5 image #15, there is a circumscribed 1.5 cm T2 hyperintense fluid collection distending the anterior aspect of the joint capsule.    There is extensive edema within the adjacent gluteal, paraspinal and psoas muscles consistent with a reactive myositis.    No acute fractures.  No evidence for septic arthritis at the hip.  Visualized portions of the abdomen and pelvis are unremarkable.                                         CT Abdomen Pelvis With IV Contrast NO Oral Contrast (Final result)  Result time 07/31/25 07:58:11      Final result by Tony Love MD (07/31/25 07:58:11)                   Impression:        1.  Status post cholecystectomy.  2.  2 intrarenal calculi are visible in the mid left kidney and may be located within a distended calyx or calyceal diverticulum.  3.  No intraperitoneal or subcutaneous abscess collections are identified.    All CT scans at this location are performed using dose modulation techniques as appropriate to a performed exam including the following: Automated exposure control; adjustment of the mA and/or kV according to patient size (this includes techniques or standardized protocols for targeted exams where dose is matched to indication / reason for exam; i.e. extremities or head); use of iterative reconstruction technique.    Finalized on: 7/31/2025 7:58 AM By:  Tony Love  [facility]# [id number]      2025-07-31 08:00:15.050     [facility]               Narrative:    EXAM:  CT ABDOMEN PELVIS WITH IV CONTRAST    CLINICAL HISTORY:  Question abscess    COMPARISON: None    TECHNIQUE: Standard thin-section axial images, with reformatted sagittal and coronal images.    FINDINGS: The lung bases are clear.   Heart size is normal.    The liver, spleen, pancreas and adrenals appear normal.  The gallbladder is surgically absent. The kidneys opacify symmetrically with intravenous contrast demonstrating no evidence of  urinary obstruction or focal parenchymal abnormality although there are 2 intrarenal calculi identified in the mid left kidney measuring 9 and 10 mm in diameter which may be within a distended calyx or calyceal diverticulum.  No calcification or distention is visible on either of the ureters throughout their course. No free intraperitoneal fluid or lymph node enlargement is identified.  A normal appendix is visible in the right lower quadrant.    Images obtained through the pelvis demonstrate no abnormality in the urinary bladder.   No free pelvic fluid is visible. The uterus and ovaries appear normal for patient age.                                           CT Hip Without Contrast Right (Final result)  Result time 07/31/25 08:01:27      Final result by Tony Love MD (07/31/25 08:01:27)                   Impression:     No CT abnormalities are appreciated at the right hip.    All CT scans at [this location] are performed using dose modulation techniques as appropriate to a performed exam including the following:  Automated exposure control; adjustment of the mA and/or kV according to patient size (this includes techniques or standardized protocols for targeted exams where dose is matched to indication / reason for exam; i.e. extremities or head); use of iterative reconstruction technique.    Finalized on: 7/31/2025 8:01 AM By:  Tony Love  [facility]# [id number]      2025-07-31 08:03:35.234     [facility]               Narrative:    EXAM: CT HIP WITHOUT CONTRAST RIGHT    CLINICAL HISTORY: Suspected septic arthritis at the right hip    COMPARISON: None    TECHNIQUE: Standard thin-section axial images, with reformatted sagittal and coronal images.  Images are displayed in soft tissue and bone window settings.    FINDINGS: No fracture deformities are identified in the right hemipelvis, right hip or proximal to mid shaft of the right femur.  The right hip joint appears normal with no joint effusion appreciated.   No soft tissue abnormalities are visible.                                         CT Head Without Contrast (Final result)  Result time 07/31/25 07:58:58      Final result by Tony Love MD (07/31/25 07:58:58)                   Impression:     No acute intracranial abnormalities are identified.    All CT scans at this location are performed using dose modulation techniques as appropriate to a performed exam including the following: Automated exposure control; adjustment of the mA and/or kV according to patient size (this includes techniques or standardized protocols for targeted exams where dose is matched to indication / reason for exam; i.e. extremities or head); use of iterative reconstruction technique.    Finalized on: 7/31/2025 7:58 AM By:  Tony Love  [facility]# [id number]      2025-07-31 08:01:05.079     [facility]               Narrative:    EXAM: EXAM:  CT HEAD WITHOUT CONTRAST    CLINICAL HISTORY: Myalgia    COMPARISON: None    TECHNIQUE: Standard thin-section axial images, with reformatted sagittal and coronal images.    FINDINGS: There is no evidence of acute hemorrhage, focal infarction or midline shift. The ventricles and extra-axial fluid spaces appear normal. Orbital contents appear unremarkable. Visualized paranasal sinuses and mastoid air spaces are clear.                                         X-Ray Chest AP Portable (Final result)  Result time 07/31/25 08:24:40      Final result by eHlder Phillips MD (07/31/25 08:24:40)                   Impression:      No acute finding in the chest.      Electronically signed by: Helder Phillips  Date:    07/31/2025  Time:    08:24               Narrative:    EXAMINATION:  XR CHEST AP PORTABLE    CLINICAL HISTORY:  Sepsis;    FINDINGS:  Comparison: None available.    Mediastinal silhouette is within normal limits.  The lungs are clear.  No pneumothorax or pleural effusion.  No acute osseous finding.                                     2:19 AM: Per Alessandro  MD Christiano from [facility], pt's CT head without IV contrast results: No acute intracranial hemorrhage, midline shift, or mass effect.    2:19 AM: Per Alessandro Alvarado MD from [facility], pt's CT right hip without IV contrast results: No acute fracture or dislocation.     4:50 AM: Per Delbert Webb MD from [facility], pt's CT abdomen and pelvis with IV contrast results:    1. Probable right-sided sacroiliitis could be due to infection.   2. 18 mm left renal stone and additional punctate left renal stone without hydronephrosis.          The Emergency Provider reviewed the vital signs and test results, which are outlined above.     ED Discussion   ED Medication(s):  Medications   acetaminophen tablet 1,000 mg (1,000 mg Oral Given 8/1/25 0006)   vancomycin - pharmacy to dose (has no administration in time range)   piperacillin-tazobactam (ZOSYN) 4.5 g in D5W 100 mL IVPB (MB+) (4.5 g Intravenous New Bag 8/1/25 0011)   sodium chloride 0.9% flush 10 mL (has no administration in time range)   naloxone 0.4 mg/mL injection 0.02 mg (has no administration in time range)   glucose chewable tablet 16 g (has no administration in time range)   glucose chewable tablet 24 g (has no administration in time range)   dextrose 50% injection 12.5 g (has no administration in time range)   dextrose 50% injection 25 g (has no administration in time range)   glucagon (human recombinant) injection 1 mg (has no administration in time range)   hydrALAZINE injection 5 mg (has no administration in time range)   polyethylene glycol packet 17 g (has no administration in time range)   prochlorperazine injection Soln 2.5 mg (has no administration in time range)   ondansetron injection 4 mg (has no administration in time range)   melatonin tablet 6 mg (6 mg Oral Given 7/31/25 2218)   vancomycin 2,000 mg in 0.9% NaCl 500 mL IVPB (admixture device) (has no administration in time range)   morphine injection 3 mg (3 mg Intravenous Given 7/31/25  2217)   HYDROcodone-acetaminophen 5-325 mg per tablet 1 tablet (has no administration in time range)   acetaminophen tablet 650 mg (has no administration in time range)   amLODIPine tablet 10 mg (has no administration in time range)   atorvastatin tablet 40 mg (40 mg Oral Given 7/31/25 2218)   ferrous sulfate tablet 1 each (has no administration in time range)   tamsulosin 24 hr capsule 0.4 mg (has no administration in time range)   gabapentin capsule 300 mg (has no administration in time range)   methocarbamoL tablet 750 mg (has no administration in time range)   HYDROmorphone injection 1 mg (1 mg Intravenous Given 7/31/25 0034)   ketorolac injection 30 mg (30 mg Intravenous Given 7/31/25 0033)   cefTRIAXone injection 1 g (1 g Intravenous Given 7/31/25 0228)   iohexoL (OMNIPAQUE 350) injection 100 mL (100 mLs Intravenous Given 7/31/25 0337)   morphine injection 4 mg (4 mg Intravenous Given 7/31/25 0355)   HYDROmorphone injection 1 mg (1 mg Intravenous Given 7/31/25 0526)   HYDROmorphone injection 1 mg (1 mg Intravenous Given 7/31/25 0859)   amLODIPine tablet 10 mg (10 mg Oral Given 7/31/25 0936)   methocarbamoL tablet 1,000 mg (1,000 mg Oral Given 7/31/25 1136)   oxyCODONE-acetaminophen  mg per tablet 1 tablet (1 tablet Oral Given 7/31/25 1216)   gabapentin capsule 200 mg (200 mg Oral Given 7/31/25 2235)       ED Course as of 08/01/25 0015   Wed Jul 30, 2025   2358 RBC, UA(!): >100 [LB]   2358 Bacteria, UA(!): Moderate [LB]   2358 WBC, UA(!): 62 [LB]   2358 NITRITE UA(!): Positive [LB]   2358 Leukocyte Esterase, UA(!): 3+ [LB]   2358 WBC(!): 13.24 [LB]   2358 Lactic Acid Level: 1.7 [LB]   2358 Sed Rate(!): 102 [LB]   2359 dorian for Hospitalization   Missy JUAN Grayson is a 40 y.o. F with PMH of T2DM (A1c 7.4%), hyperlipidemia, normocytic anemia, and hypertension who presented with progressive worsening right-sided hip / buttock pain and dysuria which began while on a cruise. On admission, found to have leukocytosis  (WBC 21.4), JOSEFINA band 3. Workup concerning for sepsis from urinary source. Urine culture with > 100,000 CFU Klebsiella pneumoniae. Completed 7 day course of ceftriaxone with resolution of UTI on repeat UA 7/18. Course also complicated by severe right hip pain limiting mobility and requiring significant pain regimen. MRI on 7/14 with concern for possible septic R SI joint, however, IR aspiration unsuccessful. Patient received 5 days vancomycin for suspected SI joint infection, though blood cultures were negative, lowering our suspicion of infected joint. Patient spiked a fever, prompting repeat imaging. MRI of R-hip had poor visualization of SI joint, but CT scan showed no fluid collection or bony destruction of the joint. Highly suspicious that her pain is a variation of reactive arthritis. CT scan was, however, significant for a large round calcified lesion in patient's liver as well as left kidney. Urology stated that findings are not consistent with renal calculi, thus low suspicion for infectious etiology. Patient's right hip pain is likely secondary to mechanical fall vs. autoimmune vs. reactive arthritis. Rheumatology was consulted and initiated a short course of high-dose steroids and will follow up outpatient. Patient stable upon discharge. Will follow in outpatient setting.    [LB]   Thu Jul 31, 2025   0213 Inadvertently ordered head CT on patient versus CT abdomen pelvis.  Patient and family member were apprised of the air.  No expected adverse effects on the patient.  I apologized the patient [LB]   0455 Possible right sided sacroilitis on CT [BA]   1141 Discussed with rheumatology on call in [facility]. States inflammatory markers are much too high for spondyloarthritis.  Still believes that this is likely septic in nature.  States patient has a joint effusion on MRI and the SI joint appears normal.  Recommends ortho consult versus IR for right hip joint aspiration. [KF]   1155 Discussed with Dr. Caro on  for ortho. States to obtain right hip MRI and follow up results.  [KF]   1500 MRI scheduled for 1800 today. Still having pain. Ordered schedule tylenol and gabapentin with oxycodone 5 mg and 10 mg prn for moderate and severe pain respectively. Updated patient and family on the current plan. They are understandable frustrated by the waiting and not having a decision on admission vs transfer.  [KF]   1642 Handoff given to Dr. Montano at shift change.  [KF]      ED Course User Index  [BA] Stew Gtz MD  [KF] Jillian Aburto DO  [LB] Zaria Jacob DO     2:00 AM: Dr. Jacob transfers care of patient to Dr. Gtz pending lab results.     6:00 AM: Dr. Gtz transfers care of patient to Dr. Aburto pending transfer acceptance.    11:35 AM: Discussed pt's case with Dr. Blayne Suero MD (Rheumatologist) who recommends orthopedics or IR to aspirate hip. Dr. Suero states SI joint looks normal.    12:14 PM: Discussed pt's case with Dr. Caro (Orthopedic surgery) and YOEL Rivera (Hospital medicine) who recommends obtaining an MRI.     4:00 PM: Dr. Aburto transfers care of patient to Dr. Montano pending MRI and transfer acceptance.    4:55 PM: Discussed case with King Hogan DO ([facility]). Dr. Hogan agrees with current care and management of pt and accepts admission.   Admitting Service: Hospital Medicine  Admitting Physician: Dr. Hogan  Admit to: obs med/surg    5:05 PM: Dr Montano Re-evaluated pt.   40-year-old female presents with a right hip pain and fever.  She was recently admitted to [facility] for sepsis thought to be secondary to Klebsiella pneumonia UTI.  She completed a 7 day course of ceftriaxone and the infection resolved.  She had persistent severe right hip pain and an MRI concerning for septic right SI joint. IR aspiration unsuccessful.  Patient treated with vancomycin for 5 days.  Blood cultures were negative but patient has spiked a fever and CT scan showed no fluid collection or  bony destruction of the joint shifting their concerns to reactive arthritis as the cause of her pain.  She had an incidental round calcification in the liver and left kidney that urology believed was not consistent with calculi or infection.  They concluded that her hip pain was likely secondary to mechanical fall vs. autoimmune vs. reactive arthritis. Rheumatology was consulted and initiated a short course of high-dose steroids.  Today CT hip is unremarkable.  CT abdomen and pelvis as well as chest x-ray did not exhibit any signs of infection.  She had mild leukocytosis with a left shift but her lactic and procalcitonin were normal.  Sed rate elevated at 102 and she has a nitrite positive UTI.  Orthopedic surgery at this facility has been consulted and deferred to spine.  Spine was then consulted and deferred to Orthopedic surgery.  Therefore an attempt to transfer back to original facility failed due to capacity.  At that time I consulted Hospital Medicine.  We discussed transferring to [place] versus admitting here.  We agreed to admit here temporarily and obtain an MRI and consult IR if needed.  MRI did confirm infectious right sacroiliitis.  Patient had already received Rocephin and vancomycin was added upon admission. I have discussed test results, shared treatment plan, and the need for admission with patient/family/caretaker at bedside. Pt and/or family/caretaker express understanding at this time and agree with all information. All questions answered. Pt/caretaker/family member(s) have no further questions or concerns at this time. Pt is ready for admit.       MIPS Measures     Smoker? No     Hypertension: Patient did not have any elevated blood pressures in the Emergency Department.       Medical Decision Making                 Medical Decision Making  41 y/o F with right sided hip pain that radiates down her leg. She was just discharged from [facility] due to right SI joint infection. At [facility], IR aspiration  was unsuccesful. She was treated with abx and steroids. Thought to be septic from a UTI. She was discharged, but at home she has worsened, not able to walk due to the pain. Here, her WBC is 13k, Sed rate is 102, UA showing evidence of UTI. CT scan showing evidence of SI joint inflammation. Temp was 99.2 on arrival. She has received rocephin. Consulted  for admission and Orthopedic Surgery for additional recommendations.     Differential diagnosis includes: Sprain, Fracture, Dislocation, Osteoarthritis, Septic Arthritis, Crystal mediated arthritis, Strain, Contusion, Ligamentous injury, Arthritis otherwise not specified.    Amount and/or Complexity of Data Reviewed  Labs: ordered. Decision-making details documented in ED Course.  Radiology: ordered. Decision-making details documented in ED Course.  ECG/medicine tests: ordered and independent interpretation performed. Decision-making details documented in ED Course.  Discussion of management or test interpretation with external provider(s): 5:22 AM: Discussed pt's case with Dr. Caro (Orthopedic Surgery) who states he does not treat spine.     5:22 AM: Discussed pt's case with Dr. Escobar (Neurosurgery) who states he does not treat SI joint issues as it requires Orthopedic Surgery service.     11:35 AM: Discussed pt's case with Dr. Blayne Suero MD (Rheumatologist) who recommends orthopedics or IR to aspirate hip. Dr. Suero states SI joint looks normal.    12:14 PM: Discussed pt's case with Dr. Caro (Orthopedic surgery) and YOEL Rivera (Hospital medicine) who recommends obtaining an MRI.     Risk  OTC drugs.  Prescription drug management.  Decision regarding hospitalization.        Prescription Management: I performed a review of the patient's current Rx medication list as input by nursing staff.    Current Discharge Medication List        CONTINUE these medications which have NOT CHANGED    Details   amLODIPine (NORVASC) 10 MG tablet 1 tablet  "Orally Once a day 90 days orally daily for 90 days      aspirin (ECOTRIN) 81 MG EC tablet Take 81 mg by mouth every morning.      atorvastatin (LIPITOR) 40 MG tablet 1 tablet Orally Once a day at bedtime orally as directed for 90 days      ferrous sulfate (FEOSOL) 325 mg (65 mg iron) Tab tablet Take by mouth 2 (two) times daily.      gabapentin (NEURONTIN) 300 MG capsule Take 1 capsule by mouth 3 (three) times daily.      glipiZIDE (GLUCOTROL) 10 MG tablet 1 tablet Orally three times day for 90 days      irbesartan-hydrochlorothiazide (AVALIDE) 150-12.5 mg per tablet 1 tablet Orally Once a day 90 days Orally Once a day for 90 days      !! metFORMIN (GLUCOPHAGE) 500 MG tablet Take 500 mg by mouth.      pantoprazole (PROTONIX) 40 MG tablet daily as needed.      traMADoL (ULTRAM) 50 mg tablet 1 tablet as needed Orally every 8 hours for 7 days      ergocalciferol (ERGOCALCIFEROL) 50,000 unit Cap TAKE ONE CAPSULE BY MOUTH EVERY WEEK Orally as directed for 84 days      !! metFORMIN (GLUCOPHAGE) 1000 MG tablet 1 tablet with a meal Orally two times a day for 90 days       !! - Potential duplicate medications found. Please discuss with provider.           Referrals:  No orders of the defined types were placed in this encounter.      Portions of this note may have been created with voice recognition software. Occasional "wrong-word" or "sound-a-like" substitutions may have occurred due to the inherent limitations of voice recognition software. Please, read the note carefully and recognize, using context, where substitutions have occurred.          Clinical Impression       ICD-10-CM ICD-9-CM   1. Sacroiliitis  M46.1 720.2   2. Screening for cardiovascular condition  Z13.6 V81.2   3. Right buttock pain  M79.18 729.1   4. Hip pain  M25.559 719.45   5. Chest pain  R07.9 786.50   6. Acute cystitis with hematuria  N30.01 595.0         ED Disposition       Scribe Attestation:   Scribe #1: I, Radha Valadez,  performed the above " scribed service and the documentation accurately describes the services I performed. I attest to the accuracy of the note.     Attending:   Physician Attestation Statement for Scribe #1: I, Zaria Jacob DO personally performed the services described in this documentation, as scribed by Radha Valadez , in my presence, and it is both accurate and complete.       Scribe Attestation:   Scribe #2: I performed the above scribed service and the documentation accurately describes the services I performed. I attest to the accuracy of the note.  Scribe #3: I performed the above scribed service and the documentation accurately describes the services I performed. I attest to the accuracy of the note.    Attending Attestation:           Physician Attestation for Scribe:    Physician Attestation Statement for Scribe #2: I, Stew Gtz MD, reviewed documentation, as scribed by Jayme Del Valle in my presence, and it is both accurate and complete. I also acknowledge and confirm the content of the note done by Scribe #1.      Physician Attestation Statement for Scribe #3: I, Jillian Aburto DO, reviewed documentation, as scribed by Stella Saul in my presence, and it is both accurate and complete. I also acknowledge and confirm the content of the note done by Scribe #1 and #2.    Scribe #4: I performed the above scribed service and the documentation accurately describes the services I performed. I attest to the accuracy of the note  Physician Attestation Statement for Scribe #4: I, Leah Montano DO, reviewed documentation, as scribed by Heaven Del Valle in my presence, and it is both accurate and complete. I also acknowledge and confirm the content of the note done by Scribe #1, #2, and #3.     Leah Montano DO  08/01/25 0015

## 2025-07-31 NOTE — PHARMACY MED REC
"Admission Medication History     The home medication history was taken by Harsha Lam.    You may go to "Admission" then "Reconcile Home Medications" tabs to review and/or act upon these items.     The home medication list has been updated by the Pharmacy department.   Please read ALL comments highlighted in yellow.   Please address this information as you see fit.    Feel free to contact us if you have any questions or require assistance.      Medications listed below were obtained from: Patient/family and Analytic software- [employer]  (Not in a hospital admission)      Potential issues to be addressed PRIOR TO DISCHARGE  {Potential issues to be addressed PRIOR TO DISCHARGE:97807:p}    Harsha Lam  EXT***                .          "

## 2025-07-31 NOTE — FIRST PROVIDER EVALUATION
Medical screening examination initiated.  I have conducted a focused provider triage encounter, findings are as follows:    Brief history of present illness:  39 yo female presenting to the ED with complaints of lower back pain d/t infection of the SI joint. Patient reports being admitted to [facility] from 07/12-07/21 with sepsis; was admitted for sepsis; also reports having UTI, kidney stones, and PNA. Reports having been on antibiotics recently; unsure exactly which one. Reports a 102 F temp today; has been taking tylenol and ibuprofen.     Vitals:    07/30/25 2155   BP: (!) 164/97   Pulse: 93   Resp: 20   Temp: 99.2 °F (37.3 °C)   SpO2: 99%       Pertinent physical exam:  VSS. NAD.     Brief workup plan:  septic work up.     Preliminary workup initiated; this workup will be continued and followed by the physician or advanced practice provider that is assigned to the patient when roomed.

## 2025-08-01 ENCOUNTER — HOSPITAL ENCOUNTER (INPATIENT)
Facility: HOSPITAL | Age: 40
LOS: 5 days | Discharge: OTHER INSTITUTION | DRG: 478 | End: 2025-08-06
Attending: INTERNAL MEDICINE | Admitting: INTERNAL MEDICINE
Payer: MEDICAID

## 2025-08-01 VITALS
RESPIRATION RATE: 16 BRPM | HEART RATE: 79 BPM | WEIGHT: 293 LBS | DIASTOLIC BLOOD PRESSURE: 60 MMHG | SYSTOLIC BLOOD PRESSURE: 125 MMHG | TEMPERATURE: 99 F | HEIGHT: 67 IN | BODY MASS INDEX: 45.99 KG/M2 | OXYGEN SATURATION: 98 %

## 2025-08-01 DIAGNOSIS — M46.1 SACROILIITIS: ICD-10-CM

## 2025-08-01 DIAGNOSIS — R07.9 CHEST PAIN: ICD-10-CM

## 2025-08-01 PROBLEM — F17.200 TOBACCO DEPENDENCY: Status: ACTIVE | Noted: 2025-08-01

## 2025-08-01 LAB
ABSOLUTE EOSINOPHIL (OHS): 0.25 K/UL
ABSOLUTE MONOCYTE (OHS): 0.84 K/UL (ref 0.3–1)
ABSOLUTE NEUTROPHIL COUNT (OHS): 7.05 K/UL (ref 1.8–7.7)
ALBUMIN SERPL BCP-MCNC: 3 G/DL (ref 3.5–5.2)
ALP SERPL-CCNC: 62 UNIT/L (ref 40–150)
ALT SERPL W/O P-5'-P-CCNC: 10 UNIT/L (ref 10–44)
ANION GAP (OHS): 9 MMOL/L (ref 8–16)
AST SERPL-CCNC: 11 UNIT/L (ref 11–45)
BASOPHILS # BLD AUTO: 0.05 K/UL
BASOPHILS NFR BLD AUTO: 0.5 %
BILIRUB SERPL-MCNC: 0.3 MG/DL (ref 0.1–1)
BUN SERPL-MCNC: 15 MG/DL (ref 6–20)
CALCIUM SERPL-MCNC: 9.5 MG/DL (ref 8.7–10.5)
CHLORIDE SERPL-SCNC: 106 MMOL/L (ref 95–110)
CO2 SERPL-SCNC: 24 MMOL/L (ref 23–29)
CREAT SERPL-MCNC: 0.7 MG/DL (ref 0.5–1.4)
ERYTHROCYTE [DISTWIDTH] IN BLOOD BY AUTOMATED COUNT: 14.7 % (ref 11.5–14.5)
GFR SERPLBLD CREATININE-BSD FMLA CKD-EPI: >60 ML/MIN/1.73/M2
GLUCOSE SERPL-MCNC: 108 MG/DL (ref 70–110)
HCT VFR BLD AUTO: 32 % (ref 37–48.5)
HGB BLD-MCNC: 10 GM/DL (ref 12–16)
HOLD SPECIMEN: NORMAL
IMM GRANULOCYTES # BLD AUTO: 0.03 K/UL (ref 0–0.04)
IMM GRANULOCYTES NFR BLD AUTO: 0.3 % (ref 0–0.5)
INR PPP: 1 (ref 0.8–1.2)
LYMPHOCYTES # BLD AUTO: 1.71 K/UL (ref 1–4.8)
MAGNESIUM SERPL-MCNC: 1.8 MG/DL (ref 1.6–2.6)
MCH RBC QN AUTO: 24.7 PG (ref 27–31)
MCHC RBC AUTO-ENTMCNC: 31.3 G/DL (ref 32–36)
MCV RBC AUTO: 79 FL (ref 82–98)
NUCLEATED RBC (/100WBC) (OHS): 0 /100 WBC
PHOSPHATE SERPL-MCNC: 4.7 MG/DL (ref 2.7–4.5)
PLATELET # BLD AUTO: 509 K/UL (ref 150–450)
PMV BLD AUTO: 9 FL (ref 9.2–12.9)
POCT GLUCOSE: 123 MG/DL (ref 70–110)
POCT GLUCOSE: 211 MG/DL (ref 70–110)
POTASSIUM SERPL-SCNC: 3.9 MMOL/L (ref 3.5–5.1)
PROT SERPL-MCNC: 7.1 GM/DL (ref 6–8.4)
PROTHROMBIN TIME: 11.4 SECONDS (ref 9–12.5)
RBC # BLD AUTO: 4.05 M/UL (ref 4–5.4)
RELATIVE EOSINOPHIL (OHS): 2.5 %
RELATIVE LYMPHOCYTE (OHS): 17.2 % (ref 18–48)
RELATIVE MONOCYTE (OHS): 8.5 % (ref 4–15)
RELATIVE NEUTROPHIL (OHS): 71 % (ref 38–73)
SODIUM SERPL-SCNC: 139 MMOL/L (ref 136–145)
WBC # BLD AUTO: 9.93 K/UL (ref 3.9–12.7)

## 2025-08-01 PROCEDURE — 80053 COMPREHEN METABOLIC PANEL: CPT | Performed by: STUDENT IN AN ORGANIZED HEALTH CARE EDUCATION/TRAINING PROGRAM

## 2025-08-01 PROCEDURE — 87205 SMEAR GRAM STAIN: CPT | Performed by: PHYSICIAN ASSISTANT

## 2025-08-01 PROCEDURE — 11000001 HC ACUTE MED/SURG PRIVATE ROOM

## 2025-08-01 PROCEDURE — 87070 CULTURE OTHR SPECIMN AEROBIC: CPT | Performed by: PHYSICIAN ASSISTANT

## 2025-08-01 PROCEDURE — 94761 N-INVAS EAR/PLS OXIMETRY MLT: CPT

## 2025-08-01 PROCEDURE — 21400001 HC TELEMETRY ROOM

## 2025-08-01 PROCEDURE — 25000003 PHARM REV CODE 250: Performed by: NURSE PRACTITIONER

## 2025-08-01 PROCEDURE — 84100 ASSAY OF PHOSPHORUS: CPT | Performed by: STUDENT IN AN ORGANIZED HEALTH CARE EDUCATION/TRAINING PROGRAM

## 2025-08-01 PROCEDURE — 85025 COMPLETE CBC W/AUTO DIFF WBC: CPT | Performed by: STUDENT IN AN ORGANIZED HEALTH CARE EDUCATION/TRAINING PROGRAM

## 2025-08-01 PROCEDURE — 87075 CULTR BACTERIA EXCEPT BLOOD: CPT | Performed by: PHYSICIAN ASSISTANT

## 2025-08-01 PROCEDURE — 0S973ZZ DRAINAGE OF RIGHT SACROILIAC JOINT, PERCUTANEOUS APPROACH: ICD-10-PCS | Performed by: RADIOLOGY

## 2025-08-01 PROCEDURE — 36415 COLL VENOUS BLD VENIPUNCTURE: CPT | Performed by: STUDENT IN AN ORGANIZED HEALTH CARE EDUCATION/TRAINING PROGRAM

## 2025-08-01 PROCEDURE — 85610 PROTHROMBIN TIME: CPT | Performed by: RADIOLOGY

## 2025-08-01 PROCEDURE — 63600175 PHARM REV CODE 636 W HCPCS: Performed by: RADIOLOGY

## 2025-08-01 PROCEDURE — 97530 THERAPEUTIC ACTIVITIES: CPT

## 2025-08-01 PROCEDURE — 97166 OT EVAL MOD COMPLEX 45 MIN: CPT

## 2025-08-01 PROCEDURE — 97116 GAIT TRAINING THERAPY: CPT

## 2025-08-01 PROCEDURE — 83735 ASSAY OF MAGNESIUM: CPT | Performed by: STUDENT IN AN ORGANIZED HEALTH CARE EDUCATION/TRAINING PROGRAM

## 2025-08-01 PROCEDURE — 36415 COLL VENOUS BLD VENIPUNCTURE: CPT | Performed by: RADIOLOGY

## 2025-08-01 PROCEDURE — 97162 PT EVAL MOD COMPLEX 30 MIN: CPT

## 2025-08-01 PROCEDURE — 63600175 PHARM REV CODE 636 W HCPCS: Performed by: STUDENT IN AN ORGANIZED HEALTH CARE EDUCATION/TRAINING PROGRAM

## 2025-08-01 PROCEDURE — 25000003 PHARM REV CODE 250

## 2025-08-01 PROCEDURE — 25000003 PHARM REV CODE 250: Performed by: STUDENT IN AN ORGANIZED HEALTH CARE EDUCATION/TRAINING PROGRAM

## 2025-08-01 RX ORDER — LIDOCAINE HYDROCHLORIDE 10 MG/ML
INJECTION, SOLUTION INFILTRATION; PERINEURAL CODE/TRAUMA/SEDATION MEDICATION
Status: COMPLETED | OUTPATIENT
Start: 2025-08-01 | End: 2025-08-01

## 2025-08-01 RX ORDER — AMLODIPINE BESYLATE 10 MG/1
10 TABLET ORAL DAILY
Status: DISCONTINUED | OUTPATIENT
Start: 2025-08-02 | End: 2025-08-07 | Stop reason: HOSPADM

## 2025-08-01 RX ORDER — HYDROMORPHONE HYDROCHLORIDE 1 MG/ML
1 INJECTION, SOLUTION INTRAMUSCULAR; INTRAVENOUS; SUBCUTANEOUS EVERY 6 HOURS PRN
Status: DISCONTINUED | OUTPATIENT
Start: 2025-08-01 | End: 2025-08-01

## 2025-08-01 RX ORDER — HYDROCODONE BITARTRATE AND ACETAMINOPHEN 5; 325 MG/1; MG/1
1 TABLET ORAL EVERY 6 HOURS PRN
Status: DISCONTINUED | OUTPATIENT
Start: 2025-08-01 | End: 2025-08-01 | Stop reason: HOSPADM

## 2025-08-01 RX ORDER — SODIUM CHLORIDE 0.9 % (FLUSH) 0.9 %
10 SYRINGE (ML) INJECTION EVERY 12 HOURS PRN
Status: DISCONTINUED | OUTPATIENT
Start: 2025-08-02 | End: 2025-08-07 | Stop reason: HOSPADM

## 2025-08-01 RX ORDER — ATORVASTATIN CALCIUM 40 MG/1
40 TABLET, FILM COATED ORAL DAILY
Status: DISCONTINUED | OUTPATIENT
Start: 2025-08-02 | End: 2025-08-07 | Stop reason: HOSPADM

## 2025-08-01 RX ORDER — OXYCODONE HCL 10 MG/1
10 TABLET, FILM COATED, EXTENDED RELEASE ORAL EVERY 12 HOURS
Refills: 0 | Status: DISCONTINUED | OUTPATIENT
Start: 2025-08-02 | End: 2025-08-02

## 2025-08-01 RX ORDER — ACETAMINOPHEN 325 MG/1
650 TABLET ORAL EVERY 6 HOURS PRN
Status: DISCONTINUED | OUTPATIENT
Start: 2025-08-01 | End: 2025-08-01 | Stop reason: HOSPADM

## 2025-08-01 RX ORDER — ASPIRIN 81 MG/1
81 TABLET ORAL EVERY MORNING
Status: DISCONTINUED | OUTPATIENT
Start: 2025-08-02 | End: 2025-08-04

## 2025-08-01 RX ORDER — GABAPENTIN 300 MG/1
300 CAPSULE ORAL 3 TIMES DAILY
Status: DISCONTINUED | OUTPATIENT
Start: 2025-08-02 | End: 2025-08-07 | Stop reason: HOSPADM

## 2025-08-01 RX ORDER — GLUCAGON 1 MG
1 KIT INJECTION
Status: DISCONTINUED | OUTPATIENT
Start: 2025-08-02 | End: 2025-08-03

## 2025-08-01 RX ORDER — FENTANYL CITRATE 50 UG/ML
INJECTION, SOLUTION INTRAMUSCULAR; INTRAVENOUS CODE/TRAUMA/SEDATION MEDICATION
Status: COMPLETED | OUTPATIENT
Start: 2025-08-01 | End: 2025-08-01

## 2025-08-01 RX ORDER — OXYCODONE AND ACETAMINOPHEN 5; 325 MG/1; MG/1
1 TABLET ORAL EVERY 6 HOURS PRN
Refills: 0 | Status: DISCONTINUED | OUTPATIENT
Start: 2025-08-02 | End: 2025-08-02

## 2025-08-01 RX ORDER — METHOCARBAMOL 750 MG/1
750 TABLET, FILM COATED ORAL EVERY 6 HOURS PRN
Status: DISCONTINUED | OUTPATIENT
Start: 2025-08-01 | End: 2025-08-01 | Stop reason: HOSPADM

## 2025-08-01 RX ORDER — OXYCODONE AND ACETAMINOPHEN 5; 325 MG/1; MG/1
1 TABLET ORAL EVERY 6 HOURS PRN
Refills: 0 | Status: DISCONTINUED | OUTPATIENT
Start: 2025-08-01 | End: 2025-08-01 | Stop reason: HOSPADM

## 2025-08-01 RX ORDER — IBUPROFEN 200 MG
24 TABLET ORAL
Status: DISCONTINUED | OUTPATIENT
Start: 2025-08-02 | End: 2025-08-02

## 2025-08-01 RX ORDER — POLYETHYLENE GLYCOL 3350 17 G/17G
17 POWDER, FOR SOLUTION ORAL 2 TIMES DAILY PRN
Status: DISCONTINUED | OUTPATIENT
Start: 2025-08-02 | End: 2025-08-04

## 2025-08-01 RX ORDER — OXYCODONE HCL 10 MG/1
10 TABLET, FILM COATED, EXTENDED RELEASE ORAL EVERY 12 HOURS
Refills: 0 | Status: DISCONTINUED | OUTPATIENT
Start: 2025-08-02 | End: 2025-08-01

## 2025-08-01 RX ORDER — DIPHENHYDRAMINE HYDROCHLORIDE 50 MG/ML
INJECTION, SOLUTION INTRAMUSCULAR; INTRAVENOUS CODE/TRAUMA/SEDATION MEDICATION
Status: COMPLETED | OUTPATIENT
Start: 2025-08-01 | End: 2025-08-01

## 2025-08-01 RX ORDER — HEPARIN SODIUM 5000 [USP'U]/ML
7500 INJECTION, SOLUTION INTRAVENOUS; SUBCUTANEOUS EVERY 8 HOURS
Status: DISCONTINUED | OUTPATIENT
Start: 2025-08-02 | End: 2025-08-02

## 2025-08-01 RX ORDER — NALOXONE HCL 0.4 MG/ML
0.02 VIAL (ML) INJECTION
Status: DISCONTINUED | OUTPATIENT
Start: 2025-08-02 | End: 2025-08-07 | Stop reason: HOSPADM

## 2025-08-01 RX ORDER — AMOXICILLIN 250 MG
1 CAPSULE ORAL DAILY PRN
Status: DISCONTINUED | OUTPATIENT
Start: 2025-08-02 | End: 2025-08-04

## 2025-08-01 RX ORDER — IBUPROFEN 200 MG
16 TABLET ORAL
Status: DISCONTINUED | OUTPATIENT
Start: 2025-08-02 | End: 2025-08-03

## 2025-08-01 RX ORDER — HYDROMORPHONE HYDROCHLORIDE 1 MG/ML
1 INJECTION, SOLUTION INTRAMUSCULAR; INTRAVENOUS; SUBCUTANEOUS EVERY 6 HOURS PRN
Status: DISCONTINUED | OUTPATIENT
Start: 2025-08-01 | End: 2025-08-01 | Stop reason: HOSPADM

## 2025-08-01 RX ADMIN — HYDROMORPHONE HYDROCHLORIDE 1 MG: 1 INJECTION, SOLUTION INTRAMUSCULAR; INTRAVENOUS; SUBCUTANEOUS at 10:08

## 2025-08-01 RX ADMIN — HYDROMORPHONE HYDROCHLORIDE 1 MG: 1 INJECTION, SOLUTION INTRAMUSCULAR; INTRAVENOUS; SUBCUTANEOUS at 04:08

## 2025-08-01 RX ADMIN — VANCOMYCIN HYDROCHLORIDE 2000 MG: 2 INJECTION, POWDER, LYOPHILIZED, FOR SOLUTION INTRAVENOUS at 05:08

## 2025-08-01 RX ADMIN — MORPHINE SULFATE 3 MG: 4 INJECTION INTRAVENOUS at 08:08

## 2025-08-01 RX ADMIN — HYDROCODONE BITARTRATE AND ACETAMINOPHEN 1 TABLET: 5; 325 TABLET ORAL at 04:08

## 2025-08-01 RX ADMIN — ACETAMINOPHEN 1000 MG: 500 TABLET ORAL at 06:08

## 2025-08-01 RX ADMIN — METHOCARBAMOL 750 MG: 750 TABLET ORAL at 12:08

## 2025-08-01 RX ADMIN — LIDOCAINE HYDROCHLORIDE 5 ML: 10 INJECTION, SOLUTION INFILTRATION; PERINEURAL at 11:08

## 2025-08-01 RX ADMIN — FERROUS SULFATE TAB 325 MG (65 MG ELEMENTAL FE) 1 EACH: 325 (65 FE) TAB at 09:08

## 2025-08-01 RX ADMIN — TAMSULOSIN HYDROCHLORIDE 0.4 MG: 0.4 CAPSULE ORAL at 09:08

## 2025-08-01 RX ADMIN — PIPERACILLIN SODIUM AND TAZOBACTAM SODIUM 4.5 G: 4; .5 INJECTION, POWDER, FOR SOLUTION INTRAVENOUS at 12:08

## 2025-08-01 RX ADMIN — AMLODIPINE BESYLATE 10 MG: 10 TABLET ORAL at 09:08

## 2025-08-01 RX ADMIN — ACETAMINOPHEN 1000 MG: 500 TABLET ORAL at 12:08

## 2025-08-01 RX ADMIN — OXYCODONE HYDROCHLORIDE AND ACETAMINOPHEN 1 TABLET: 5; 325 TABLET ORAL at 02:08

## 2025-08-01 RX ADMIN — ACETAMINOPHEN 1000 MG: 500 TABLET ORAL at 05:08

## 2025-08-01 RX ADMIN — VANCOMYCIN HYDROCHLORIDE 1500 MG: 1.5 INJECTION, POWDER, LYOPHILIZED, FOR SOLUTION INTRAVENOUS at 02:08

## 2025-08-01 RX ADMIN — GABAPENTIN 300 MG: 300 CAPSULE ORAL at 09:08

## 2025-08-01 RX ADMIN — HYDROCODONE BITARTRATE AND ACETAMINOPHEN 1 TABLET: 5; 325 TABLET ORAL at 12:08

## 2025-08-01 RX ADMIN — FENTANYL CITRATE 50 MCG: 50 INJECTION, SOLUTION INTRAMUSCULAR; INTRAVENOUS at 11:08

## 2025-08-01 RX ADMIN — DIPHENHYDRAMINE HYDROCHLORIDE 25 MG: 50 INJECTION INTRAMUSCULAR; INTRAVENOUS at 11:08

## 2025-08-01 RX ADMIN — PIPERACILLIN SODIUM AND TAZOBACTAM SODIUM 4.5 G: 4; .5 INJECTION, POWDER, FOR SOLUTION INTRAVENOUS at 09:08

## 2025-08-01 RX ADMIN — GABAPENTIN 300 MG: 300 CAPSULE ORAL at 03:08

## 2025-08-01 RX ADMIN — PIPERACILLIN SODIUM AND TAZOBACTAM SODIUM 4.5 G: 4; .5 INJECTION, POWDER, FOR SOLUTION INTRAVENOUS at 04:08

## 2025-08-01 NOTE — ASSESSMENT & PLAN NOTE
Noted on imaging on admission.  Recently evaluated by Urology at OSH 07/12/25, and evaluation of the time noted no indication for urologic surgical intervention as back pains is on the oxygen side, so unlikely to be related. Recommended Flomax 0.4mg daily to dilate distal ureters.

## 2025-08-01 NOTE — SEDATION DOCUMENTATION
Procedure complete; 2cc of sanguinous fluid aspirated at site; needle removed; manual pressure applied

## 2025-08-01 NOTE — PT/OT/SLP EVAL
Physical Therapy Evaluation    Patient Name:  Missy Grayson   MRN:  [id number]    Recommendations:     Discharge Recommendations: High Intensity Therapy   Discharge Equipment Recommendations: walker, rolling   Barriers to discharge: Decreased caregiver support    Assessment:     Missy Grayson is a 40 y.o. female admitted with a medical diagnosis of Sacroiliitis.  She presents with the following impairments/functional limitations: weakness, impaired endurance, impaired self care skills, impaired functional mobility, gait instability, impaired balance, pain, decreased lower extremity function, decreased ROM .    Rehab Prognosis: Good; patient would benefit from acute skilled PT services to address these deficits and reach maximum level of function.    Recent Surgery: * No surgery found *      Plan:     During this hospitalization, patient to be seen 3 x/week to address the identified rehab impairments via gait training, therapeutic activities, therapeutic exercises and progress toward the following goals:    Plan of Care Expires:  08/15/25    Subjective     Chief Complaint: PAIN R HIP   Patient/Family Comments/goals: DEC PAIN   Pain/Comfort:  Pain Rating 1: 8/10  Location - Side 1: Right  Location 1: hip  Pain Addressed 1: Pre-medicate for activity  Pain Rating Post-Intervention 1: 8/10    Patients cultural, spiritual, Voodoo conflicts given the current situation:      Living Environment:   PT LIVES AT HOME WITH SIG OTHER IN A ONE STORY HOME WITH 6 STEPS TO ENTER WITH B RAILING   Prior to admission, patients level of function was IND, 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  Equipment used at home: none.  DME owned (not currently used): rolling walker.  Upon discharge, patient will have assistance from SIG OTHER .    Objective:     Communicated with NURSE AND EPIC CHART REVIEW  prior to session.  Patient found supine with peripheral IV, telemetry  upon PT entry to room.    General Precautions: Standard, fall  Orthopedic  "Precautions:N/A   Braces: N/A  Respiratory Status: Room air    Exams:  RLE ROM: LIMITED D/T PAIN   RLE Strength: NA  LLE ROM: WNL  LLE Strength: WNL    Functional Mobility:  Bed Mobility:     Rolling Right: contact guard assistance  Scooting: contact guard assistance  Supine to Sit: contact guard assistance  Transfers:     Sit to Stand:  minimum assistance with rolling walker  Bed to Chair: minimum assistance with  rolling walker  using  Stand Pivot  Gait: PT GT TRAINED X8' WITH STEP TO GT AND MIN A       AM-PAC 6 CLICK MOBILITY  Total Score:16       Treatment & Education:  GT. BELT AND  SOCKS DONNED PRIOR TO OOB MOBILITY.  PT WITH R KNEE BUCKLING WITH GT TRAINING WITH INC PAIN NOTED. PT T/F TO CHAIR. PT EDUCATED ON ROLE OF P.T. AND PT EDUCATED ON "CALL DON'T FALL", ENCOURAGED TO CALL FOR ASSISTANCE WITH ALL NEEDS FOR OOB MOBILITY. PT EDUCATED ON TE AP, TKE AND MIP FOR INC ROM AND STRENGTHENING.     Patient left up in chair with call button in reach and chair alarm on.    GOALS:   Multidisciplinary Problems       Physical Therapy Goals          Problem: Physical Therapy    Goal Priority Disciplines Outcome Interventions   Physical Therapy Goal     PT, PT/OT     Description: Lt/15/25  1. PT WILL COMPLETE BED MOBILITY IND  2. PT WILL STAND T/F TO CHAIR WITH RW AND CGA  3. PT WILL GT TRAIN X 100' WITH RW AND CGA TO PROGRESS GT.   4. PT WILL INC AMPAC SCORE BY 2 POINTS TO PROGRESS GROSS FUNC MOBILITY.                          DME Justifications:  No DME recommended requiring DME justifications    History:     Past Medical History:   Diagnosis Date    Diabetes mellitus     Hypertension        Past Surgical History:   Procedure Laterality Date     SECTION         Time Tracking:     PT Received On: 25  PT Start Time: 903     PT Stop Time: 926  PT Total Time (min): 23 min     Billable Minutes: Evaluation 13 and Gait Training 10      2025  "

## 2025-08-01 NOTE — ASSESSMENT & PLAN NOTE
"Presented to ED for evaluation of worsening hip pain causing gait abnormalities.  She initially presented to [facility] on 07/12/25 with progressive worsening right-sided hip / buttock pain and dysuria which began while on a cruise.  See HPI for OSH hospital course,     ED course notable for labs with WBC 13.24, hemoglobin 11.2, MCV 80, platelets 599, ,  -CT hip without contrast nonacute.    -ED staff discussed with orthopedic surgery and neurosurgery with evaluation recommending plans for transfer to [facility] for higher level of care for orthopedic surgery hip service that was not available at this facility.    -ED staff also discussed with Rheumatology at [facility], and after review of previous MRI and labs noted concerns for right hip joint effusion and recommended right hip joint aspiration.  Evaluation at the time noted low suspicion for rheumatologic etiology due to inflammatory markers with concerns of potential infection instead.    -MRI Hip Without Contrast Right  noted concerns for "Infectious sacroiliitis on the right ".     PLAN:  -NPO at midnight  -Orthopedic surgery consulted, follow-up recs  -Interventional Radiology consulted for evaluation of fluid collection, follow-up recs  -Pain management  -Antibioitics  Antibiotics (From admission, onward)      Start     Stop Route Frequency Ordered    07/31/25 1815  piperacillin-tazobactam (ZOSYN) 4.5 g in D5W 100 mL IVPB (MB+)         -- IV Every 8 hours (non-standard times) 07/31/25 1705    07/31/25 1803  vancomycin - pharmacy to dose  (vancomycin IVPB (PEDS and ADULTS))        Placed in "And" Linked Group    -- IV pharmacy to manage frequency 07/31/25 1705    07/31/25 1730  vancomycin 2,000 mg in 0.9% NaCl 500 mL IVPB (admixture device)         08/01/25 0529 IV ED 1 Time 07/31/25 1726          "

## 2025-08-01 NOTE — ASSESSMENT & PLAN NOTE
"Presented to ED for evaluation of worsening hip pain causing gait abnormalities.  She initially presented to [facility] on 07/12/25 with progressive worsening right-sided hip / buttock pain and dysuria which began while on a cruise.  See HPI for OSH hospital course,     ED course notable for labs with WBC 13.24, hemoglobin 11.2, MCV 80, platelets 599, ,  -CT hip without contrast nonacute.    -ED staff discussed with orthopedic surgery and neurosurgery with evaluation recommending plans for transfer to [facility] for higher level of care for orthopedic surgery hip service that was not available at this facility.    -ED staff also discussed with Rheumatology at [facility], and after review of previous MRI and labs noted concerns for right hip joint effusion and recommended right hip joint aspiration.  Evaluation at the time noted low suspicion for rheumatologic etiology due to inflammatory markers with concerns of potential infection instead.    -MRI Hip Without Contrast Right  noted concerns for "Infectious sacroiliitis on the right ".     PLAN:  -NPO at midnight  -Orthopedic surgery consulted, follow-up recs  -Interventional Radiology consulted for evaluation of fluid collection, follow-up recs  -Pain management  -Antibioitics  Antibiotics (From admission, onward)      Start     Stop Route Frequency Ordered    07/31/25 1815  piperacillin-tazobactam (ZOSYN) 4.5 g in D5W 100 mL IVPB (MB+)         -- IV Every 8 hours (non-standard times) 07/31/25 1705    07/31/25 1803  vancomycin - pharmacy to dose  (vancomycin IVPB (PEDS and ADULTS))        Placed in "And" Linked Group    -- IV pharmacy to manage frequency 07/31/25 1705    07/31/25 1730  vancomycin 2,000 mg in 0.9% NaCl 500 mL IVPB (admixture device)         08/01/25 0529 IV ED 1 Time 07/31/25 1726          "

## 2025-08-01 NOTE — CONSULTS
[id number]  Response for E-Consult     Patient Name: Missy Grayson  MRN: [id number]  Primary Care Provider: Lennie, Primary Doctor   Requesting Provider: Jillian Aburto,   Consults 40-year-old female who was recently hospitalized because of right pelvic pain.  It was thought that she may have sacroiliitis.  She had an aspiration of the SI joint which was a dry tap.  She had increased inflammatory markers.  She had increased white count.  She was treated for possible infection.  She is still having the pain.  I reviewed her CT scan and she shows no evidence of sacroiliitis.  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  An MRI was reported to show a small right hip joint effusion    Recommendation:  Right hip aspiration    Additional future steps to consider:  I recommended aspiration of the right hip.    Total time of Consultation: 10 minute    I did speak to the requesting provider verbally about this.     *This eConsult is based on the clinical data available to me and is furnished without benefit of a physical examination. The eConsult will need to be interpreted in light of any clinical issues or changes in patient status not available to me at the time of filing this eConsults. Significant changes in patient condition or level of acuity should result in immediate formal consultation and reevaluation. Please alert me if you have further questions.    Thank you for this eConsult referral.     MD Roger PolancoSouthwestern Medical Center – LawtonCrow[address]

## 2025-08-01 NOTE — ASSESSMENT & PLAN NOTE
Home Hyperlipidemia Medications per chart review             atorvastatin (LIPITOR) 40 MG tablet 1 tablet Orally Once a day at bedtime orally as directed for 90 days            PLAN  -continue home statin

## 2025-08-01 NOTE — ASSESSMENT & PLAN NOTE
Normoglycemic on admission    Home regimen:   Diabetes Medications              glipiZIDE (GLUCOTROL) 10 MG tablet 1 tablet Orally three times day for 90 days    metFORMIN (GLUCOPHAGE) 1000 MG tablet 1 tablet with a meal Orally two times a day for 90 days    metFORMIN (GLUCOPHAGE) 500 MG tablet Take 500 mg by mouth.            Most recent A1c reviewed-   Lab Results   Component Value Date    HGBA1C 7.4 (H) 07/20/2025        Most recent glucose reviewed-  Recent Labs     07/31/25  0930   POCTGLUCOSE 99         Plan:  - Diabetic diet  - POCT glucose ACHS  - Will monitor glucose results and intiate insulin regimen accordingly  - Hold oral hypoglycemic agents while patient is in the hospital.

## 2025-08-01 NOTE — OP NOTE
Pre Op Diagnosis: pelvic pain     Post Op Diagnosis: same     Procedure:  aspiration     Procedure performed by: Jacqueline CONLEY, Marika HODGES     Written Informed Consent Obtained: Yes     Specimen Removed:  yes     Estimated Blood Loss:  minimal     Findings: Local anesthesia     Sedation:  pain control     The patient tolerated the procedure well and there were no complications.      Disposition:  F/U in clinic or with ordering physician    Discharge instructions:  Light activity for 24 hours.  Remove band aid in 24 hours.  No baths (showers are appropriate).      Sterile technique was performed in the right anterior pelvis, lidocaine was used as a local anesthetic.  Approx 2 ccs of sanguinous fluid removed and sent to lab.  Pt tolerated the procedure well without immediate complications.  Please see radiologist report for details. F/u with PCP and/or ordering physician.

## 2025-08-01 NOTE — ASSESSMENT & PLAN NOTE
"Presented to ED for evaluation of worsening hip pain causing gait abnormalities.  She initially presented to [facility] on 07/12/25 with progressive worsening right-sided hip / buttock pain and dysuria which began while on a cruise.  See HPI for OSH hospital course,     ED course notable for labs with WBC 13.24, hemoglobin 11.2, MCV 80, platelets 599, ,  -CT hip without contrast nonacute.    -ED staff discussed with orthopedic surgery and neurosurgery with evaluation recommending plans for transfer to [facility] for higher level of care for orthopedic surgery hip service that was not available at this facility.    -ED staff also discussed with Rheumatology at [facility], and after review of previous MRI and labs noted concerns for right hip joint effusion and recommended right hip joint aspiration.  Evaluation at the time noted low suspicion for rheumatologic etiology due to inflammatory markers with concerns of potential infection instead.    -MRI Hip Without Contrast Right  noted concerns for "Infectious sacroiliitis on the right ".     PLAN:  -NPO at midnight  -Orthopedic surgery consulted, follow-up recs  -Interventional Radiology consulted for evaluation of fluid collection, follow-up recs  -Pain management  -Antibioitics  Antibiotics (From admission, onward)      Start     Stop Route Frequency Ordered    07/31/25 1815  piperacillin-tazobactam (ZOSYN) 4.5 g in D5W 100 mL IVPB (MB+)         -- IV Every 8 hours (non-standard times) 07/31/25 1705    07/31/25 1803  vancomycin - pharmacy to dose  (vancomycin IVPB (PEDS and ADULTS))        Placed in "And" Linked Group    -- IV pharmacy to manage frequency 07/31/25 1705    07/31/25 1730  vancomycin 2,000 mg in 0.9% NaCl 500 mL IVPB (admixture device)         08/01/25 0529 IV ED 1 Time 07/31/25 1726          "

## 2025-08-01 NOTE — H&P
"  [facility] Medicine  History & Physical    Patient Name: Missy Grayson  MRN: [id number]  Patient Class: OP- Observation  Admission Date: 7/30/2025  Attending Physician: King Hogan DO   Primary Care Provider: Lennie, Primary Doctor         Patient information was obtained from patient, relative(s), past medical records, and ER records.     Subjective:     Principal Problem:Sacroiliitis    Chief Complaint:   Chief Complaint   Patient presents with    Joint Infection     Pt was being treated for infection in SI Joint on the 12th. Pt reports an increase in pain, fever, and continuation of swelling and discomfort.         HPI: Ms. Missy Grayson is a 40 y.o. female who  has a medical history of T2DM (A1c 7.4%), hyperlipidemia, normocytic anemia, and hypertension    She  presented to ED for evaluation of worsening hip pain now causing gait abnormalities.  She initially presented to [facility] on 07/12/25 with progressive worsening right-sided hip / buttock pain and dysuria which began while on a cruise.  Per chart review of [facility] hospital course,     "On admission, found to have leukocytosis (WBC 21.4), JOSEFINA band 3. Workup concerning for sepsis from urinary source. Urine culture with > 100,000 CFU Klebsiella pneumoniae. Completed 7 day course of ceftriaxone with resolution of UTI on repeat UA 7/18. Course also complicated by severe right hip pain limiting mobility and requiring significant pain regimen. MRI on 7/14 with concern for possible septic R SI joint, however, IR aspiration unsuccessful. Patient received 5 days vancomycin for suspected SI joint infection, though blood cultures were negative, lowering our suspicion of infected joint. Patient spiked a fever, prompting repeat imaging. MRI of R-hip had poor visualization of SI joint, but CT scan showed no fluid collection or bony destruction of the joint. Highly suspicious that her pain is a variation of reactive arthritis. CT scan was, however, " "significant for a large round calcified lesion in patient's liver as well as left kidney. Urology stated that findings are not consistent with renal calculi, thus low suspicion for infectious etiology. Patient's right hip pain is likely secondary to mechanical fall vs. autoimmune vs. reactive arthritis. Rheumatology was consulted and initiated a short course of high-dose steroids and will follow up outpatient. Patient stable upon discharge. Will follow in outpatient setting. "    She reports since discharge her pain has been worsening and now she is having difficulty walking due to stabbing pain.  The pain starts in her right lower hip area and radiates down her thigh.  Reports tenderness to palpation in right lower hip area.  She reports the symptoms initially started when she was on a cruise about 3 weeks ago.  She was feeling nauseated so she went to the restroom for vomiting and when she went to the restroom, she fell onto her knees.  The next morning when she woke up she had significant difficulty with ambulation and was limping throughout the day and even required wheelchair for ambulation assistance due to severity of her symptoms.  She reports the pain symptoms have been persistent since then and denies any alleviation.      ED course notable for hemodynamically stable vitals.  Labs with WBC 13.24, hemoglobin 11.2, MCV 80, platelets 599, , UA with WBC 62/ RBC >100/ Leukocyte esterase 3+, Nitrite positive.  CT abdomen pelvis with IV contrast noted " Status post cholecystectomy. 2 intrarenal calculi are visible in the mid left kidney and may be located within a distended calyx or calyceal diverticulum. No intraperitoneal or subcutaneous abscess collections are identified." CT hip without contrast nonacute.  ED staff discussed with orthopedic surgery and neurosurgery with evaluation recommending plans for transfer to [facility] for higher level of care for orthopedic surgery hip service that was not available " "at this facility.  ED staff also discussed with Rheumatology at [facility], and after review of previous MRI and labs noted concerns for right hip joint effusion and recommended right hip joint aspiration.  Evaluation at the time noted low suspicion for rheumatologic etiology due to inflammatory markers with concerns of potential infection instead.  MRI Hip Without Contrast Right  noted concerns for "Infectious sacroiliitis on the right ".     Hospital Medicine consulted for medical management/admission while pending transfer.    Past Medical History:   Diagnosis Date    Diabetes mellitus     Hypertension        Past Surgical History:   Procedure Laterality Date     SECTION         Review of patient's allergies indicates:  No Known Allergies    No current facility-administered medications on file prior to encounter.     Current Outpatient Medications on File Prior to Encounter   Medication Sig    amLODIPine (NORVASC) 10 MG tablet 1 tablet Orally Once a day 90 days orally daily for 90 days    aspirin (ECOTRIN) 81 MG EC tablet Take 81 mg by mouth every morning.    atorvastatin (LIPITOR) 40 MG tablet 1 tablet Orally Once a day at bedtime orally as directed for 90 days    ferrous sulfate (FEOSOL) 325 mg (65 mg iron) Tab tablet Take by mouth 2 (two) times daily.    gabapentin (NEURONTIN) 300 MG capsule Take 1 capsule by mouth 3 (three) times daily.    glipiZIDE (GLUCOTROL) 10 MG tablet 1 tablet Orally three times day for 90 days    irbesartan-hydrochlorothiazide (AVALIDE) 150-12.5 mg per tablet 1 tablet Orally Once a day 90 days Orally Once a day for 90 days    metFORMIN (GLUCOPHAGE) 500 MG tablet Take 500 mg by mouth.    pantoprazole (PROTONIX) 40 MG tablet 1 tablet 1/2 to 1 hour before morning meal Orally Once a day for 30 days  (*take for GI protection)    traMADoL (ULTRAM) 50 mg tablet 1 tablet as needed Orally every 8 hours for 7 days    ergocalciferol (ERGOCALCIFEROL) 50,000 unit Cap TAKE ONE CAPSULE BY MOUTH " EVERY WEEK Orally as directed for 84 days    metFORMIN (GLUCOPHAGE) 1000 MG tablet 1 tablet with a meal Orally two times a day for 90 days     Family History       Problem Relation (Age of Onset)    Hypertension Father          Tobacco Use    Smoking status: Every Day    Smokeless tobacco: Not on file   Vaping Use    Vaping status: Never Used   Substance and Sexual Activity    Alcohol use: No    Drug use: No    Sexual activity: Yes     Partners: Female     Review of Systems   Constitutional:  Positive for fatigue. Negative for chills and fever.   Respiratory:  Negative for cough and shortness of breath.    Cardiovascular:  Negative for chest pain and leg swelling.   Gastrointestinal:  Negative for abdominal pain, constipation, diarrhea, nausea and vomiting.   Genitourinary:  Negative for difficulty urinating and dysuria.   Musculoskeletal:  Positive for arthralgias, back pain and gait problem.     Objective:     Vital Signs (Most Recent):  Temp: 98.9 °F (37.2 °C) (07/31/25 0936)  Pulse: 91 (07/31/25 2139)  Resp: 15 (07/31/25 1724)  BP: 138/64 (07/31/25 1724)  SpO2: 100 % (07/31/25 1724) Vital Signs (24h Range):  Temp:  [98.9 °F (37.2 °C)] 98.9 °F (37.2 °C)  Pulse:  [70-91] 91  Resp:  [10-21] 15  SpO2:  [95 %-100 %] 100 %  BP: (108-157)/(57-97) 138/64     Weight: (!) 165.1 kg (364 lb)  Body mass index is 57.01 kg/m².     Physical Exam  Vitals and nursing note reviewed. Exam conducted with a chaperone present (Gabby Bowen RN).   Constitutional:       General: She is not in acute distress.     Appearance: She is morbidly obese. She is ill-appearing (due to pain). She is not toxic-appearing or diaphoretic.   HENT:      Head: Normocephalic and atraumatic.      Mouth/Throat:      Mouth: Mucous membranes are moist.   Eyes:      General: No scleral icterus.        Right eye: No discharge.         Left eye: No discharge.   Cardiovascular:      Rate and Rhythm: Normal rate and regular rhythm.      Heart sounds: Normal  heart sounds.   Pulmonary:      Effort: Pulmonary effort is normal. No respiratory distress.      Breath sounds: Normal breath sounds.   Abdominal:      General: Bowel sounds are normal. There is no distension.      Palpations: Abdomen is soft.      Tenderness: There is no abdominal tenderness. There is no guarding or rebound.   Musculoskeletal:      Cervical back: No rigidity.      Right hip: Tenderness and bony tenderness present.      Left hip: No tenderness.      Right lower leg: No edema.      Left lower leg: No edema.   Skin:     General: Skin is warm and dry.      Coloration: Skin is not jaundiced.   Neurological:      Mental Status: She is alert and oriented to person, place, and time. Mental status is at baseline.   Psychiatric:         Mood and Affect: Mood normal.         Behavior: Behavior normal.                Significant Labs: All pertinent labs within the past 24 hours have been reviewed.  Recent Results (from the past 24 hours)   Blood culture x two cultures. Draw prior to antibiotics.    Collection Time: 07/30/25 10:13 PM    Specimen: Peripheral, Antecubital, Right; Blood   Result Value Ref Range    Blood Culture No Growth After 6 Hours    Comprehensive metabolic panel    Collection Time: 07/30/25 10:13 PM   Result Value Ref Range    Sodium 142 136 - 145 mmol/L    Potassium 3.7 3.5 - 5.1 mmol/L    Chloride 105 95 - 110 mmol/L    CO2 24 23 - 29 mmol/L    Glucose 111 (H) 70 - 110 mg/dL    BUN 20 6 - 20 mg/dL    Creatinine 0.8 0.5 - 1.4 mg/dL    Calcium 10.2 8.7 - 10.5 mg/dL    Protein Total 8.7 (H) 6.0 - 8.4 gm/dL    Albumin 3.5 3.5 - 5.2 g/dL    Bilirubin Total 0.3 0.1 - 1.0 mg/dL    ALP 79 40 - 150 unit/L    AST 11 11 - 45 unit/L    ALT 13 10 - 44 unit/L    Anion Gap 13 8 - 16 mmol/L    eGFR >60 >60 mL/min/1.73/m2   CBC with Differential    Collection Time: 07/30/25 10:13 PM   Result Value Ref Range    WBC 13.24 (H) 3.90 - 12.70 K/uL    RBC 4.44 4.00 - 5.40 M/uL    HGB 11.2 (L) 12.0 - 16.0 gm/dL     HCT 35.4 (L) 37.0 - 48.5 %    MCV 80 (L) 82 - 98 fL    MCH 25.2 (L) 27.0 - 31.0 pg    MCHC 31.6 (L) 32.0 - 36.0 g/dL    RDW 14.9 (H) 11.5 - 14.5 %    Platelet Count 599 (H) 150 - 450 K/uL    MPV 8.7 (L) 9.2 - 12.9 fL    Nucleated RBC 0 <=0 /100 WBC    Neut % 74.3 (H) 38 - 73 %    Lymph % 17.2 (L) 18 - 48 %    Mono % 5.9 4 - 15 %    Eos % 1.8 <=8 %    Basophil % 0.5 <=1.9 %    Imm Grans % 0.3 0.0 - 0.5 %    Neut # 9.84 (H) 1.8 - 7.7 K/uL    Lymph # 2.28 1 - 4.8 K/uL    Mono # 0.78 0.3 - 1 K/uL    Eos # 0.24 <=0.5 K/uL    Baso # 0.06 <=0.2 K/uL    Imm Grans # 0.04 0.00 - 0.04 K/uL   EKG 12-lead    Collection Time: 07/30/25 10:13 PM   Result Value Ref Range    QRS Duration 86 ms    [facility] QTC Calculation 435 ms   Blood culture x two cultures. Draw prior to antibiotics.    Collection Time: 07/30/25 10:16 PM    Specimen: Peripheral, Upper Arm, Right; Blood   Result Value Ref Range    Blood Culture No Growth After 6 Hours    Procalcitonin    Collection Time: 07/30/25 10:21 PM   Result Value Ref Range    Procalcitonin 0.02 <0.25 ng/mL   Sedimentation rate    Collection Time: 07/30/25 10:21 PM   Result Value Ref Range    Sed Rate 102 (H) <=36 mm/hr   Lactic acid, plasma #1    Collection Time: 07/30/25 10:49 PM   Result Value Ref Range    Lactic Acid Level 1.7 0.5 - 2.2 mmol/L   Urinalysis, Reflex to Urine Culture Urine, Clean Catch    Collection Time: 07/30/25 11:21 PM    Specimen: Urine   Result Value Ref Range    Color, UA Yellow Straw, Karena, Yellow, Light-Orange    Appearance, UA Hazy (A) Clear    pH, UA 7.0 5.0 - 8.0    Spec Grav UA 1.030 1.005 - 1.030    Protein, UA 1+ (A) Negative    Glucose, UA Negative Negative    Ketones, UA Negative Negative    Bilirubin, UA Negative Negative    Blood, UA 3+ (A) Negative    Nitrites, UA Positive (A) Negative    Urobilinogen, UA 2.0-3.0 (A) <2.0 EU/dL    Leukocyte Esterase, UA 3+ (A) Negative   Urinalysis Microscopic    Collection Time: 07/30/25 11:21 PM   Result Value Ref Range     RBC, UA >100 (H) 0 - 4 /HPF    WBC, UA 62 (H) 0 - 5 /HPF    Bacteria, UA Moderate (A) None, Rare, Occasional /HPF    Hyaline Casts, UA 0 0 - 1 /LPF    Microscopic Comment     POCT glucose    Collection Time: 07/31/25  9:30 AM   Result Value Ref Range    POCT Glucose 99 70 - 110 mg/dL   Comprehensive metabolic panel    Collection Time: 07/31/25  9:27 PM   Result Value Ref Range    Sodium 140 136 - 145 mmol/L    Potassium 3.8 3.5 - 5.1 mmol/L    Chloride 105 95 - 110 mmol/L    CO2 23 23 - 29 mmol/L    Glucose 117 (H) 70 - 110 mg/dL    BUN 14 6 - 20 mg/dL    Creatinine 0.7 0.5 - 1.4 mg/dL    Calcium 10.2 8.7 - 10.5 mg/dL    Protein Total 8.5 (H) 6.0 - 8.4 gm/dL    Albumin 3.6 3.5 - 5.2 g/dL    Bilirubin Total 0.4 0.1 - 1.0 mg/dL    ALP 66 40 - 150 unit/L    AST 14 11 - 45 unit/L    ALT 12 10 - 44 unit/L    Anion Gap 12 8 - 16 mmol/L    eGFR >60 >60 mL/min/1.73/m2   Magnesium    Collection Time: 07/31/25  9:27 PM   Result Value Ref Range    Magnesium  1.8 1.6 - 2.6 mg/dL   Phosphorus    Collection Time: 07/31/25  9:27 PM   Result Value Ref Range    Phosphorus Level 3.7 2.7 - 4.5 mg/dL   CBC with Differential    Collection Time: 07/31/25  9:27 PM   Result Value Ref Range    WBC 9.97 3.90 - 12.70 K/uL    RBC 4.56 4.00 - 5.40 M/uL    HGB 11.5 (L) 12.0 - 16.0 gm/dL    HCT 36.0 (L) 37.0 - 48.5 %    MCV 79 (L) 82 - 98 fL    MCH 25.2 (L) 27.0 - 31.0 pg    MCHC 31.9 (L) 32.0 - 36.0 g/dL    RDW 14.8 (H) 11.5 - 14.5 %    Platelet Count 581 (H) 150 - 450 K/uL    MPV 8.6 (L) 9.2 - 12.9 fL    Nucleated RBC 0 <=0 /100 WBC    Neut % 73.4 (H) 38 - 73 %    Lymph % 17.8 (L) 18 - 48 %    Mono % 5.7 4 - 15 %    Eos % 2.3 <=8 %    Basophil % 0.4 <=1.9 %    Imm Grans % 0.4 0.0 - 0.5 %    Neut # 7.32 1.8 - 7.7 K/uL    Lymph # 1.77 1 - 4.8 K/uL    Mono # 0.57 0.3 - 1 K/uL    Eos # 0.23 <=0.5 K/uL    Baso # 0.04 <=0.2 K/uL    Imm Grans # 0.04 0.00 - 0.04 K/uL       Significant Imaging: I have reviewed all pertinent imaging results/findings  within the past 24 hours.  MRI Hip Without Contrast Right   Final Result    Infectious sacroiliitis on the right as described above.      Finalized on: 7/31/2025 9:35 PM By:  Taj Duran   [facility]# [id number]      2025-07-31 21:37:44.188     [facility]      CT Abdomen Pelvis With IV Contrast NO Oral Contrast   Final Result         1.  Status post cholecystectomy.   2.  2 intrarenal calculi are visible in the mid left kidney and may be located within a distended calyx or calyceal diverticulum.   3.  No intraperitoneal or subcutaneous abscess collections are identified.      All CT scans at this location are performed using dose modulation techniques as appropriate to a performed exam including the following: Automated exposure control; adjustment of the mA and/or kV according to patient size (this includes techniques or standardized protocols for targeted exams where dose is matched to indication / reason for exam; i.e. extremities or head); use of iterative reconstruction technique.      Finalized on: 7/31/2025 7:58 AM By:  Tony Love   [facility]# [id number]      2025-07-31 08:00:15.050     [facility]      CT Hip Without Contrast Right   Final Result    No CT abnormalities are appreciated at the right hip.      All CT scans at [this location] are performed using dose modulation techniques as appropriate to a performed exam including the following:  Automated exposure control; adjustment of the mA and/or kV according to patient size (this includes techniques or standardized protocols for targeted exams where dose is matched to indication / reason for exam; i.e. extremities or head); use of iterative reconstruction technique.      Finalized on: 7/31/2025 8:01 AM By:  Tony Love   [facility]# [id number]      2025-07-31 08:03:35.234     [facility]      CT Head Without Contrast   Final Result    No acute intracranial abnormalities are identified.      All CT scans at this location are performed using dose modulation techniques as  appropriate to a performed exam including the following: Automated exposure control; adjustment of the mA and/or kV according to patient size (this includes techniques or standardized protocols for targeted exams where dose is matched to indication / reason for exam; i.e. extremities or head); use of iterative reconstruction technique.      Finalized on: 7/31/2025 7:58 AM By:  Tony Love   [facility]# [id number]      2025-07-31 08:01:05.079     [facility]      X-Ray Chest AP Portable   Final Result      No acute finding in the chest.         Electronically signed by: Helder Rodas   Date:    07/31/2025   Time:    08:24          Assessment/Plan:     Assessment & Plan  UTI (urinary tract infection)  Concerns for UTI due to abnormal UA on admission with leukocytosis on labs and reported back pain        Recent Labs   Lab 07/30/25  2321   COLORU Yellow   APPEARANCEUA Hazy*   PHUR 7.0   SPECGRAV 1.030   PROTEINUA 1+*   GLUCUA Negative   BILIRUBINUA Negative   OCCULTUA 3+*   NITRITE Positive*   UROBILINOGEN 2.0-3.0*   LEUKOCYTESUR 3+*   RBCUA >100*   WBCUA 62*   BACTERIA Moderate*   HYALINECASTS 0     PLAN  Followup urine cultures, urine gram stain  Antibiotics: Already on antibiotics for infectious sacroiliitis concerns      Sacroiliitis  Right leg weakness  Right hip joint effusion  Effusion of sacroiliac joint  Presented to ED for evaluation of worsening hip pain causing gait abnormalities.  She initially presented to [facility] on 07/12/25 with progressive worsening right-sided hip / buttock pain and dysuria which began while on a cruise.  See HPI for OSH hospital course,     ED course notable for labs with WBC 13.24, hemoglobin 11.2, MCV 80, platelets 599, ,  -CT hip without contrast nonacute.    -ED staff discussed with orthopedic surgery and neurosurgery with evaluation recommending plans for transfer to [facility] for higher level of care for orthopedic surgery hip service that was not available at this facility.    -ED  "staff also discussed with Rheumatology at [facility], and after review of previous MRI and labs noted concerns for right hip joint effusion and recommended right hip joint aspiration.  Evaluation at the time noted low suspicion for rheumatologic etiology due to inflammatory markers with concerns of potential infection instead.    -MRI Hip Without Contrast Right  noted concerns for "Infectious sacroiliitis on the right ".     PLAN:  -NPO at midnight  -Orthopedic surgery consulted, follow-up recs  -Interventional Radiology consulted for evaluation of fluid collection, follow-up recs  -Pain management  -Antibioitics  Antibiotics (From admission, onward)      Start     Stop Route Frequency Ordered    07/31/25 1815  piperacillin-tazobactam (ZOSYN) 4.5 g in D5W 100 mL IVPB (MB+)         -- IV Every 8 hours (non-standard times) 07/31/25 1705    07/31/25 1803  vancomycin - pharmacy to dose  (vancomycin IVPB (PEDS and ADULTS))        Placed in "And" Linked Group    -- IV pharmacy to manage frequency 07/31/25 1705    07/31/25 1730  vancomycin 2,000 mg in 0.9% NaCl 500 mL IVPB (admixture device)         08/01/25 0529 IV ED 1 Time 07/31/25 1726          Type 2 diabetes mellitus, without long-term current use of insulin  Normoglycemic on admission    Home regimen:   Diabetes Medications              glipiZIDE (GLUCOTROL) 10 MG tablet 1 tablet Orally three times day for 90 days    metFORMIN (GLUCOPHAGE) 1000 MG tablet 1 tablet with a meal Orally two times a day for 90 days    metFORMIN (GLUCOPHAGE) 500 MG tablet Take 500 mg by mouth.            Most recent A1c reviewed-   Lab Results   Component Value Date    HGBA1C 7.4 (H) 07/20/2025        Most recent glucose reviewed-  Recent Labs     07/31/25  0930   POCTGLUCOSE 99         Plan:  - Diabetic diet  - POCT glucose ACHS  - Will monitor glucose results and intiate insulin regimen accordingly  - Hold oral hypoglycemic agents while patient is in the hospital.    Renal calculus, " left  Noted on imaging on admission.  Recently evaluated by Urology at OSH 07/12/25, and evaluation of the time noted no indication for urologic surgical intervention as back pains is on the oxygen side, so unlikely to be related. Recommended Flomax 0.4mg daily to dilate distal ureters.      Hypertension  Home meds for hypertension were reviewed and noted below.     Home Hypertension Medications per chart review             amLODIPine (NORVASC) 10 MG tablet 1 tablet Orally Once a day 90 days orally daily for 90 days    irbesartan-hydrochlorothiazide (AVALIDE) 150-12.5 mg per tablet 1 tablet Orally Once a day 90 days Orally Once a day for 90 days            Patients blood pressure range in the last 24 hours was: BP  Min: 108/57  Max: 164/97.      PLAN:  -While in the hospital, will manage blood pressure as follows; Continue home antihypertensive regimen  -The patient's inpatient anti-hypertensive regimen is listed below:  Current Antihypertensives  , ,   , ,   hydrALAZINE injection 5 mg, Every 6 hours PRN, Intravenous  amLODIPine tablet 10 mg, Daily, Oral  -Will utilize p.r.n. blood pressure medication only if patient's blood pressure greater than 180/110 and she develops symptoms such as worsening chest pain or shortness of breath.    Hyperlipidemia    Home Hyperlipidemia Medications per chart review             atorvastatin (LIPITOR) 40 MG tablet 1 tablet Orally Once a day at bedtime orally as directed for 90 days            PLAN  -continue home statin      VTE Risk Mitigation (From admission, onward)           Ordered     IP VTE LOW RISK PATIENT  Once         07/31/25 1705     Place sequential compression device  Until discontinued         07/31/25 1705                                   On 07/31/2025, patient should be placed in hospital observation services under my care.             King Hogan DO  Department of Hospital Medicine  [facility]    Voice recognition software was used in the creation of  this note/communication and any sound-alike/typographical errors which may have occurred despite initial review prior to signing should be taken in context when interpreting.  If such errors prevent a clear understanding of the note/communication, please contact the provider/office for clarification.

## 2025-08-01 NOTE — ASSESSMENT & PLAN NOTE
Home meds for hypertension were reviewed and noted below.     Home Hypertension Medications per chart review             amLODIPine (NORVASC) 10 MG tablet 1 tablet Orally Once a day 90 days orally daily for 90 days    irbesartan-hydrochlorothiazide (AVALIDE) 150-12.5 mg per tablet 1 tablet Orally Once a day 90 days Orally Once a day for 90 days            Patients blood pressure range in the last 24 hours was: BP  Min: 108/57  Max: 164/97.      PLAN:  -While in the hospital, will manage blood pressure as follows; Continue home antihypertensive regimen  -The patient's inpatient anti-hypertensive regimen is listed below:  Current Antihypertensives  , ,   , ,   hydrALAZINE injection 5 mg, Every 6 hours PRN, Intravenous  amLODIPine tablet 10 mg, Daily, Oral  -Will utilize p.r.n. blood pressure medication only if patient's blood pressure greater than 180/110 and she develops symptoms such as worsening chest pain or shortness of breath.

## 2025-08-01 NOTE — PROGRESS NOTES
"Pharmacokinetic Initial Assessment: IV Vancomycin    Assessment/Plan:    Initiate intravenous vancomycin with loading dose of 2000 mg once followed by a maintenance dose of vancomycin 1500 mg IV every 8 hours  Desired empiric serum trough concentration is 15 to 20 mcg/mL  Draw vancomycin trough level 60 min prior to fifth dose on 08/02 at approximately 1230  Pharmacy will continue to follow and monitor vancomycin.      Please contact pharmacy at extension [phone] with any questions regarding this assessment.     Thank you for the consult,   Rainjasen Shaw       Patient brief summary:  Missy Grayson is a 40 y.o. female initiated on antimicrobial therapy with IV Vancomycin for treatment of suspected bone/joint infection    Drug Allergies:   Review of patient's allergies indicates:  No Known Allergies    Actual Body Weight:   165.1 kg    Renal Function:   Estimated Creatinine Clearance: 177.2 mL/min (based on SCr of 0.7 mg/dL).,     Dialysis Method (if applicable):  N/A    CBC (last 72 hours):  Recent Labs   Lab Result Units 07/30/25 2213 07/31/25 2127   WBC K/uL 13.24* 9.97   HGB gm/dL 11.2* 11.5*   HCT % 35.4* 36.0*   Platelet Count K/uL 599* 581*   Lymph % % 17.2* 17.8*   Mono % % 5.9 5.7   Eos % % 1.8 2.3   Basophil % % 0.5 0.4       Metabolic Panel (last 72 hours):  Recent Labs   Lab Result Units 07/30/25 2213 07/30/25  2321 07/31/25  2127   Sodium mmol/L 142  --  140   Potassium mmol/L 3.7  --  3.8   Chloride mmol/L 105  --  105   CO2 mmol/L 24  --  23   Glucose mg/dL 111*  --  117*   Glucose, UA   --  Negative  --    BUN mg/dL 20  --  14   Creatinine mg/dL 0.8  --  0.7   Albumin g/dL 3.5  --  3.6   Bilirubin Total mg/dL 0.3  --  0.4   ALP unit/L 79  --  66   AST unit/L 11  --  14   ALT unit/L 13  --  12   Magnesium  mg/dL  --   --  1.8   Phosphorus Level mg/dL  --   --  3.7       Drug levels (last 3 results):  No results for input(s): "VANCOMYCINRA", "VANCORANDOM", "VANCOMYCINPE", "VANCOPEAK", "VANCOMYCINTR", " ""VANCOTROUGH" in the last 72 hours.    Microbiologic Results:  Microbiology Results (last 7 days)       Procedure Component Value Units Date/Time    Blood culture x two cultures. Draw prior to antibiotics. [[id number]]  (Normal) Collected: 07/30/25 2213    Order Status: Completed Specimen: Blood from Peripheral, Antecubital, Right Updated: 07/31/25 1900     Blood Culture No Growth After 6 Hours    Blood culture x two cultures. Draw prior to antibiotics. [[id number]]  (Normal) Collected: 07/30/25 2216    Order Status: Completed Specimen: Blood from Peripheral, Upper Arm, Right Updated: 07/31/25 1900     Blood Culture No Growth After 6 Hours    Urine culture [[id number]] Collected: 07/30/25 2321    Order Status: Sent Specimen: Urine Updated: 07/30/25 2349            "

## 2025-08-01 NOTE — SUBJECTIVE & OBJECTIVE
Past Medical History:   Diagnosis Date    Diabetes mellitus     Hypertension        Past Surgical History:   Procedure Laterality Date     SECTION         Review of patient's allergies indicates:  No Known Allergies    No current facility-administered medications on file prior to encounter.     Current Outpatient Medications on File Prior to Encounter   Medication Sig    amLODIPine (NORVASC) 10 MG tablet 1 tablet Orally Once a day 90 days orally daily for 90 days    aspirin (ECOTRIN) 81 MG EC tablet Take 81 mg by mouth every morning.    atorvastatin (LIPITOR) 40 MG tablet 1 tablet Orally Once a day at bedtime orally as directed for 90 days    ferrous sulfate (FEOSOL) 325 mg (65 mg iron) Tab tablet Take by mouth 2 (two) times daily.    gabapentin (NEURONTIN) 300 MG capsule Take 1 capsule by mouth 3 (three) times daily.    glipiZIDE (GLUCOTROL) 10 MG tablet 1 tablet Orally three times day for 90 days    irbesartan-hydrochlorothiazide (AVALIDE) 150-12.5 mg per tablet 1 tablet Orally Once a day 90 days Orally Once a day for 90 days    metFORMIN (GLUCOPHAGE) 500 MG tablet Take 500 mg by mouth.    pantoprazole (PROTONIX) 40 MG tablet 1 tablet 1/2 to 1 hour before morning meal Orally Once a day for 30 days  (*take for GI protection)    traMADoL (ULTRAM) 50 mg tablet 1 tablet as needed Orally every 8 hours for 7 days    ergocalciferol (ERGOCALCIFEROL) 50,000 unit Cap TAKE ONE CAPSULE BY MOUTH EVERY WEEK Orally as directed for 84 days    metFORMIN (GLUCOPHAGE) 1000 MG tablet 1 tablet with a meal Orally two times a day for 90 days     Family History       Problem Relation (Age of Onset)    Hypertension Father          Tobacco Use    Smoking status: Every Day    Smokeless tobacco: Not on file   Vaping Use    Vaping status: Never Used   Substance and Sexual Activity    Alcohol use: No    Drug use: No    Sexual activity: Yes     Partners: Female     Review of Systems   Constitutional:  Positive for fatigue. Negative for  chills and fever.   Respiratory:  Negative for cough and shortness of breath.    Cardiovascular:  Negative for chest pain and leg swelling.   Gastrointestinal:  Negative for abdominal pain, constipation, diarrhea, nausea and vomiting.   Genitourinary:  Negative for difficulty urinating and dysuria.   Musculoskeletal:  Positive for arthralgias, back pain and gait problem.     Objective:     Vital Signs (Most Recent):  Temp: 98.9 °F (37.2 °C) (07/31/25 0936)  Pulse: 91 (07/31/25 2139)  Resp: 15 (07/31/25 1724)  BP: 138/64 (07/31/25 1724)  SpO2: 100 % (07/31/25 1724) Vital Signs (24h Range):  Temp:  [98.9 °F (37.2 °C)] 98.9 °F (37.2 °C)  Pulse:  [70-91] 91  Resp:  [10-21] 15  SpO2:  [95 %-100 %] 100 %  BP: (108-157)/(57-97) 138/64     Weight: (!) 165.1 kg (364 lb)  Body mass index is 57.01 kg/m².     Physical Exam  Vitals and nursing note reviewed. Exam conducted with a chaperone present (Gabby Bowen RN).   Constitutional:       General: She is not in acute distress.     Appearance: She is morbidly obese. She is ill-appearing (due to pain). She is not toxic-appearing or diaphoretic.   HENT:      Head: Normocephalic and atraumatic.      Mouth/Throat:      Mouth: Mucous membranes are moist.   Eyes:      General: No scleral icterus.        Right eye: No discharge.         Left eye: No discharge.   Cardiovascular:      Rate and Rhythm: Normal rate and regular rhythm.      Heart sounds: Normal heart sounds.   Pulmonary:      Effort: Pulmonary effort is normal. No respiratory distress.      Breath sounds: Normal breath sounds.   Abdominal:      General: Bowel sounds are normal. There is no distension.      Palpations: Abdomen is soft.      Tenderness: There is no abdominal tenderness. There is no guarding or rebound.   Musculoskeletal:      Cervical back: No rigidity.      Right hip: Tenderness and bony tenderness present.      Left hip: No tenderness.      Right lower leg: No edema.      Left lower leg: No edema.    Skin:     General: Skin is warm and dry.      Coloration: Skin is not jaundiced.   Neurological:      Mental Status: She is alert and oriented to person, place, and time. Mental status is at baseline.   Psychiatric:         Mood and Affect: Mood normal.         Behavior: Behavior normal.                Significant Labs: All pertinent labs within the past 24 hours have been reviewed.  Recent Results (from the past 24 hours)   Blood culture x two cultures. Draw prior to antibiotics.    Collection Time: 07/30/25 10:13 PM    Specimen: Peripheral, Antecubital, Right; Blood   Result Value Ref Range    Blood Culture No Growth After 6 Hours    Comprehensive metabolic panel    Collection Time: 07/30/25 10:13 PM   Result Value Ref Range    Sodium 142 136 - 145 mmol/L    Potassium 3.7 3.5 - 5.1 mmol/L    Chloride 105 95 - 110 mmol/L    CO2 24 23 - 29 mmol/L    Glucose 111 (H) 70 - 110 mg/dL    BUN 20 6 - 20 mg/dL    Creatinine 0.8 0.5 - 1.4 mg/dL    Calcium 10.2 8.7 - 10.5 mg/dL    Protein Total 8.7 (H) 6.0 - 8.4 gm/dL    Albumin 3.5 3.5 - 5.2 g/dL    Bilirubin Total 0.3 0.1 - 1.0 mg/dL    ALP 79 40 - 150 unit/L    AST 11 11 - 45 unit/L    ALT 13 10 - 44 unit/L    Anion Gap 13 8 - 16 mmol/L    eGFR >60 >60 mL/min/1.73/m2   CBC with Differential    Collection Time: 07/30/25 10:13 PM   Result Value Ref Range    WBC 13.24 (H) 3.90 - 12.70 K/uL    RBC 4.44 4.00 - 5.40 M/uL    HGB 11.2 (L) 12.0 - 16.0 gm/dL    HCT 35.4 (L) 37.0 - 48.5 %    MCV 80 (L) 82 - 98 fL    MCH 25.2 (L) 27.0 - 31.0 pg    MCHC 31.6 (L) 32.0 - 36.0 g/dL    RDW 14.9 (H) 11.5 - 14.5 %    Platelet Count 599 (H) 150 - 450 K/uL    MPV 8.7 (L) 9.2 - 12.9 fL    Nucleated RBC 0 <=0 /100 WBC    Neut % 74.3 (H) 38 - 73 %    Lymph % 17.2 (L) 18 - 48 %    Mono % 5.9 4 - 15 %    Eos % 1.8 <=8 %    Basophil % 0.5 <=1.9 %    Imm Grans % 0.3 0.0 - 0.5 %    Neut # 9.84 (H) 1.8 - 7.7 K/uL    Lymph # 2.28 1 - 4.8 K/uL    Mono # 0.78 0.3 - 1 K/uL    Eos # 0.24 <=0.5 K/uL     Baso # 0.06 <=0.2 K/uL    Imm Grans # 0.04 0.00 - 0.04 K/uL   EKG 12-lead    Collection Time: 07/30/25 10:13 PM   Result Value Ref Range    QRS Duration 86 ms    [facility] QTC Calculation 435 ms   Blood culture x two cultures. Draw prior to antibiotics.    Collection Time: 07/30/25 10:16 PM    Specimen: Peripheral, Upper Arm, Right; Blood   Result Value Ref Range    Blood Culture No Growth After 6 Hours    Procalcitonin    Collection Time: 07/30/25 10:21 PM   Result Value Ref Range    Procalcitonin 0.02 <0.25 ng/mL   Sedimentation rate    Collection Time: 07/30/25 10:21 PM   Result Value Ref Range    Sed Rate 102 (H) <=36 mm/hr   Lactic acid, plasma #1    Collection Time: 07/30/25 10:49 PM   Result Value Ref Range    Lactic Acid Level 1.7 0.5 - 2.2 mmol/L   Urinalysis, Reflex to Urine Culture Urine, Clean Catch    Collection Time: 07/30/25 11:21 PM    Specimen: Urine   Result Value Ref Range    Color, UA Yellow Straw, Karena, Yellow, Light-Orange    Appearance, UA Hazy (A) Clear    pH, UA 7.0 5.0 - 8.0    Spec Grav UA 1.030 1.005 - 1.030    Protein, UA 1+ (A) Negative    Glucose, UA Negative Negative    Ketones, UA Negative Negative    Bilirubin, UA Negative Negative    Blood, UA 3+ (A) Negative    Nitrites, UA Positive (A) Negative    Urobilinogen, UA 2.0-3.0 (A) <2.0 EU/dL    Leukocyte Esterase, UA 3+ (A) Negative   Urinalysis Microscopic    Collection Time: 07/30/25 11:21 PM   Result Value Ref Range    RBC, UA >100 (H) 0 - 4 /HPF    WBC, UA 62 (H) 0 - 5 /HPF    Bacteria, UA Moderate (A) None, Rare, Occasional /HPF    Hyaline Casts, UA 0 0 - 1 /LPF    Microscopic Comment     POCT glucose    Collection Time: 07/31/25  9:30 AM   Result Value Ref Range    POCT Glucose 99 70 - 110 mg/dL   Comprehensive metabolic panel    Collection Time: 07/31/25  9:27 PM   Result Value Ref Range    Sodium 140 136 - 145 mmol/L    Potassium 3.8 3.5 - 5.1 mmol/L    Chloride 105 95 - 110 mmol/L    CO2 23 23 - 29 mmol/L    Glucose 117 (H) 70 -  110 mg/dL    BUN 14 6 - 20 mg/dL    Creatinine 0.7 0.5 - 1.4 mg/dL    Calcium 10.2 8.7 - 10.5 mg/dL    Protein Total 8.5 (H) 6.0 - 8.4 gm/dL    Albumin 3.6 3.5 - 5.2 g/dL    Bilirubin Total 0.4 0.1 - 1.0 mg/dL    ALP 66 40 - 150 unit/L    AST 14 11 - 45 unit/L    ALT 12 10 - 44 unit/L    Anion Gap 12 8 - 16 mmol/L    eGFR >60 >60 mL/min/1.73/m2   Magnesium    Collection Time: 07/31/25  9:27 PM   Result Value Ref Range    Magnesium  1.8 1.6 - 2.6 mg/dL   Phosphorus    Collection Time: 07/31/25  9:27 PM   Result Value Ref Range    Phosphorus Level 3.7 2.7 - 4.5 mg/dL   CBC with Differential    Collection Time: 07/31/25  9:27 PM   Result Value Ref Range    WBC 9.97 3.90 - 12.70 K/uL    RBC 4.56 4.00 - 5.40 M/uL    HGB 11.5 (L) 12.0 - 16.0 gm/dL    HCT 36.0 (L) 37.0 - 48.5 %    MCV 79 (L) 82 - 98 fL    MCH 25.2 (L) 27.0 - 31.0 pg    MCHC 31.9 (L) 32.0 - 36.0 g/dL    RDW 14.8 (H) 11.5 - 14.5 %    Platelet Count 581 (H) 150 - 450 K/uL    MPV 8.6 (L) 9.2 - 12.9 fL    Nucleated RBC 0 <=0 /100 WBC    Neut % 73.4 (H) 38 - 73 %    Lymph % 17.8 (L) 18 - 48 %    Mono % 5.7 4 - 15 %    Eos % 2.3 <=8 %    Basophil % 0.4 <=1.9 %    Imm Grans % 0.4 0.0 - 0.5 %    Neut # 7.32 1.8 - 7.7 K/uL    Lymph # 1.77 1 - 4.8 K/uL    Mono # 0.57 0.3 - 1 K/uL    Eos # 0.23 <=0.5 K/uL    Baso # 0.04 <=0.2 K/uL    Imm Grans # 0.04 0.00 - 0.04 K/uL       Significant Imaging: I have reviewed all pertinent imaging results/findings within the past 24 hours.  MRI Hip Without Contrast Right   Final Result    Infectious sacroiliitis on the right as described above.      Finalized on: 7/31/2025 9:35 PM By:  Taj Duran   [facility]# [id number]      2025-07-31 21:37:44.188     [facility]      CT Abdomen Pelvis With IV Contrast NO Oral Contrast   Final Result         1.  Status post cholecystectomy.   2.  2 intrarenal calculi are visible in the mid left kidney and may be located within a distended calyx or calyceal diverticulum.   3.  No intraperitoneal or subcutaneous  abscess collections are identified.      All CT scans at this location are performed using dose modulation techniques as appropriate to a performed exam including the following: Automated exposure control; adjustment of the mA and/or kV according to patient size (this includes techniques or standardized protocols for targeted exams where dose is matched to indication / reason for exam; i.e. extremities or head); use of iterative reconstruction technique.      Finalized on: 7/31/2025 7:58 AM By:  Tony Love   [facility]# [id number]      2025-07-31 08:00:15.050     [facility]      CT Hip Without Contrast Right   Final Result    No CT abnormalities are appreciated at the right hip.      All CT scans at [this location] are performed using dose modulation techniques as appropriate to a performed exam including the following:  Automated exposure control; adjustment of the mA and/or kV according to patient size (this includes techniques or standardized protocols for targeted exams where dose is matched to indication / reason for exam; i.e. extremities or head); use of iterative reconstruction technique.      Finalized on: 7/31/2025 8:01 AM By:  Tony Love   [facility]# [id number]      2025-07-31 08:03:35.234     [facility]      CT Head Without Contrast   Final Result    No acute intracranial abnormalities are identified.      All CT scans at this location are performed using dose modulation techniques as appropriate to a performed exam including the following: Automated exposure control; adjustment of the mA and/or kV according to patient size (this includes techniques or standardized protocols for targeted exams where dose is matched to indication / reason for exam; i.e. extremities or head); use of iterative reconstruction technique.      Finalized on: 7/31/2025 7:58 AM By:  Tony Love   [facility]# [id number]      2025-07-31 08:01:05.079     [facility]      X-Ray Chest AP Portable   Final Result      No acute finding in the chest.          Electronically signed by: Helder Rodas   Date:    07/31/2025   Time:    08:24

## 2025-08-01 NOTE — PT/OT/SLP EVAL
Occupational Therapy   Evaluation    Name: Missy Grayson  MRN: [id number]  Admitting Diagnosis: Sacroiliitis  Recent Surgery: * No surgery found *      Recommendations:     Discharge Recommendations: High Intensity Therapy  Discharge Equipment Recommendations:  none  Barriers to discharge:  Inaccessible home environment    Assessment:     Missy Grayson is a 40 y.o. female with a medical diagnosis of Sacroiliitis.  She presents with the following deficits affecting function: weakness, impaired balance, decreased upper extremity function, impaired endurance, impaired self care skills, gait instability, decreased coordination, impaired functional mobility, pain, decreased safety awareness.      Rehab Prognosis: Good; patient would benefit from acute skilled OT services to address these deficits and reach maximum level of function.       Plan:     Patient to be seen 2 x/week to address the above listed problems via self-care/home management, therapeutic activities, therapeutic exercises  Plan of Care Expires: 08/15/25  Plan of Care Reviewed with: patient    Subjective     Chief Complaint: debility and generalized weakness  Patient/Family Comments/goals:  get stronger    Occupational Profile:  Living Environment: lives in mobile home  with 6 steps to enter and b rail  Previous level of function: (I)with adl's and mod (I) with functional mobility  Roles and Routines: still drives  and work  Equipment Used at Home: none      Pain/Comfort:  Pain Rating 1: 10/10  Location - Side 1: Right  Location - Orientation 1: generalized    Patients cultural, spiritual, Sabianism conflicts given the current situation:      Objective:     Communicated with: nurse [facility] chart review prior to session.  Patient found up in chair with telemetry upon OT entry to room.    General Precautions: Standard, fall  Orthopedic Precautions: N/A  Braces: N/A  Respiratory Status: Room air    Occupational Performance:    Functional  Mobility/Transfers:  Patient completed Sit <> Stand Transfer with minimum assistance  with  rolling walker   Functional Mobility: total a with pre-gait activities    Activities of Daily Living:  Lower Body Dressing: maximal assistance .    Cognitive/Visual Perceptual:  Cognitive/Psychosocial Skills:     -       Oriented to: Person, Place, Time, and Situation   -       Follows Commands/attention:Follows multistep  commands  -       Communication: clear/fluent  -       Memory: No Deficits noted  -       Safety awareness/insight to disability: impaired     Physical Exam:  Upper Extremity Range of Motion:     -       Right Upper Extremity: WFL  -       Left Upper Extremity: WFL  Upper Extremity Strength:    -       Right Upper Extremity: mmt: 4/5 grossly  -       Left Upper Extremity: mmt: 4/5 grossly   Strength: -       Right Upper Extremity: mmt: 4/5 grossly  -       Left Upper Extremity: mmt: 4/5 grossly    AMPAC 6 Click ADL:  AMPAC Total Score: 17    Treatment & Education:  Educated patient on importance of increased tolerance to upright position and direct impact on CV endurance and strength. Patient encouraged to sit up in chair, for a minimum of 2 consecutive hours including for all meals.. Encouraged patient to perform AROM TE to BUE throughout the day within all available planes of motion. Re enforced importance of utilizing call light to meet needs in room and not attempt to get up without staff assistance. Patient verbalized understanding and agreed to comply.           Patient left up in chair with all lines intact, call button in reach, and nurse Frieda  notified    GOALS:   Multidisciplinary Problems       Occupational Therapy Goals          Problem: Occupational Therapy    Goal Priority Disciplines Outcome Interventions   Occupational Therapy Goal     OT, PT/OT     Description: O.T. goals to be met by 8-15-25  Pt will tolerate 1 set x 15 reps b ue rom exercise  Min a with toilet transfers  Min a  with le dressing                       DME Justifications:  TBD on next level of care      History:     Past Medical History:   Diagnosis Date    Diabetes mellitus     Hypertension          Past Surgical History:   Procedure Laterality Date     SECTION         Time Tracking:     OT Date of Treatment: 25  OT Start Time: 101  OT Stop Time: 1035  OT Total Time (min): 20 min    Billable Minutes:Evaluation 10 minutes  Therapeutic Activity 10 minutes    2025

## 2025-08-01 NOTE — PROVIDER TRANSFER
(Physician in Lead of Transfers)  Outside Transfer Acceptance Note / Regional Referral Center    Upon arrival to the floor, please send a SecureChat to [facility]. If there are emergent issues, or if there is no response to the SecureChat within 15 minutes, call extension [phone] (if no answer, do NOT leave a callback number after the beep, rather please call the  to connect you with the  Hospital Medicine admit line on-call physician), for Hospital Medicine admit team assignment and for additional admit orders for the patient. Do not page the attending physician associated with the patient on arrival (this physician may not be on duty at the time of arrival). Rather, always send a SecureChat to [facility] (subsequently call [phone] if needed) to reach the triage physician for orders and team assignment.    Referring facility:  [facility]  Referring provider: ROBBY ANDERSON  Accepting facility: [facility]  Accepting provider: REECE CASAS  Reason for transfer: Higher Level of Care   Transfer diagnosis: sacroiliitis  Transfer specialty requested: Neurosurgery  Transfer specialty notified: Yes  Transfer level: NUMBER 1-5: 2  Bed type requested: med-surg  Isolation status: No active isolations   Admission class or status: IP- Inpatient      Narrative     40-year-old female with a history of diabetes, hypertension, hyperlipidemia, and anemia previously admitted to [facility] July 12-21 with sacroiliitis, L4-5 disc bulge, nephrolithiasis, and sepsis.  She was treated with IV antibiotics, and she was seen by Rheumatology during her stay with concern for possible variant reactive arthritis.  CCP antibodies were negative, DOROTHY was negative, and HLA B27 is negative.    She was admitted to [facility] on July 30 with worsening hip pain that was causing difficulty with ambulation.  ED spoke with Orthopedic Surgery and Neurosurgery at the facility, and  recommendation was for transfer back to the [facility] for continued management. ED spoke with Rheumatology at [facility], and recommendation was for right hip joint aspiration.  She was admitted with concern for sacroiliitis with sacroiliac joint effusion and UTI.  She had CT-guided aspiration of the right pelvic fluid collection on August 1.  Fluid was sent for studies.  Currently, she remains on Zosyn and vancomycin.  By report she is not having significant urinary symptoms.  Hip pain persists, but lower extremities are intact from a neurovascular standpoint.  No active bowel or bladder symptoms.  Veins awake and alert, in no distress.  [facility] is unable to take the patient.  Transfer center contacted Spine Service at [facility] with concern for sacroiliitis, and they are available for consultation.  Will plan transfer to Hospital Medicine at [facility] for continued treatment.    August 1: INR 1, sodium 139, potassium 3.9, chloride 106, CO2 24, BUN 15, creatinine 0.7, glucose 108, albumin 3, AST 11, ALT 10, magnesium 1.8, phosphorus 4.7, white blood cells 9.93, hemoglobin 10, hematocrit 32, platelets 509  -pelvic abscess cultures collected    July 31: CT hip had no CT abnormalities appreciated at the right hip.  -CT head had no acute intracranial abnormalities identified.  -CT abdomen pelvis noted status post cholecystectomy.  Two intrarenal calculi in the mid left kidney may be located within a distended calyx or calyceal diverticulum.  No intraperitoneal or subcutaneous abscess collections identified.  -MRI hip was concerning for infectious sacroiliitis on the right.    July 30: Urine culture with Gram-negative rods  -blood cultures with no growth so far  -lactic acid 1.7, procalcitonin 0.02  -chest x-ray had no acute finding in the chest.    Objective     Vitals: Temp: 98.2 °F (36.8 °C) (08/01/25 1232)  Pulse: 75 (08/01/25 1232)  Resp: 18 (08/01/25 1232)  BP: 126/74  (08/01/25 1232)  SpO2: 97 % (08/01/25 1232)  Recent Labs: CBC:   Recent Labs   Lab 07/30/25 2213 07/31/25 2127 08/01/25  0550   WBC 13.24* 9.97 9.93   HGB 11.2* 11.5* 10.0*   HCT 35.4* 36.0* 32.0*   * 581* 509*     CMP:   Recent Labs   Lab 07/30/25 2213 07/31/25 2127 08/01/25  0550    140 139   K 3.7 3.8 3.9    105 106   CO2 24 23 24   * 117* 108   BUN 20 14 15   CREATININE 0.8 0.7 0.7   CALCIUM 10.2 10.2 9.5   PROT 8.7* 8.5* 7.1   ALBUMIN 3.5 3.6 3.0*   BILITOT 0.3 0.4 0.3   ALKPHOS 79 66 62   AST 11 14 11   ALT 13 12 10   ANIONGAP 13 12 9         Instructions    Admit to Hospital Medicine  Consult Spine Surgery      NOMI Denson MD  Hospital Medicine Staff  Cell: [phone]

## 2025-08-01 NOTE — INTERVAL H&P NOTE
The patient has been examined and the H&P has been reviewed:    I concur with the findings and no changes have occurred since H&P was written.        Active Hospital Problems    Diagnosis  POA    *Sacroiliitis [M46.1]  Yes    UTI (urinary tract infection) [N39.0]  Yes    Right leg weakness [R29.898]  Yes    Right hip joint effusion [M25.451]  Yes    Effusion of sacroiliac joint [M25.48]  Yes    Type 2 diabetes mellitus, without long-term current use of insulin [E11.9]  Yes    Renal calculus, left [N20.0]  Yes    Hypertension [I10]  Yes    Hyperlipidemia [E78.5]  Yes      Resolved Hospital Problems   No resolved problems to display.

## 2025-08-01 NOTE — HPI
"Ms. Missy Grayson is a 40 y.o. female who  has a medical history of T2DM (A1c 7.4%), hyperlipidemia, normocytic anemia, and hypertension    She  presented to ED for evaluation of worsening hip pain causing gait abnormalities.  She initially presented to [facility] on 07/12/25 with progressive worsening right-sided hip / buttock pain and dysuria which began while on a cruise.  Per chart review of [facility] hospital course,     "On admission, found to have leukocytosis (WBC 21.4), OJSEFINA band 3. Workup concerning for sepsis from urinary source. Urine culture with > 100,000 CFU Klebsiella pneumoniae. Completed 7 day course of ceftriaxone with resolution of UTI on repeat UA 7/18. Course also complicated by severe right hip pain limiting mobility and requiring significant pain regimen. MRI on 7/14 with concern for possible septic R SI joint, however, IR aspiration unsuccessful. Patient received 5 days vancomycin for suspected SI joint infection, though blood cultures were negative, lowering our suspicion of infected joint. Patient spiked a fever, prompting repeat imaging. MRI of R-hip had poor visualization of SI joint, but CT scan showed no fluid collection or bony destruction of the joint. Highly suspicious that her pain is a variation of reactive arthritis. CT scan was, however, significant for a large round calcified lesion in patient's liver as well as left kidney. Urology stated that findings are not consistent with renal calculi, thus low suspicion for infectious etiology. Patient's right hip pain is likely secondary to mechanical fall vs. autoimmune vs. reactive arthritis. Rheumatology was consulted and initiated a short course of high-dose steroids and will follow up outpatient. Patient stable upon discharge. Will follow in outpatient setting. "    She reports since discharge her pain has been worsening and now she is having difficulty walking due to stabbing pain.  The pain starts in her right lower hip area and radiates down " "her thigh.  Reports tenderness to palpation in right lower hip area.  She reports the symptoms initially started when she was on a cruise about 3 weeks ago.  She was feeling nauseated so she went to the restroom for vomiting and when she went to the restroom, she fell onto her knees.  The next morning when she woke up she had significant difficulty with ambulation and was limping throughout the day and even required wheelchair for ambulation assistance due to severity of her symptoms.  She reports the pain symptoms have been persistent since then and denies any alleviation.      ED course notable for hemodynamically stable vitals.  Labs with WBC 13.24, hemoglobin 11.2, MCV 80, platelets 599, , UA with WBC 62/ RBC >100/ Leukocyte esterase 3+, Nitrite positive.  CT abdomen pelvis with IV contrast noted " Status post cholecystectomy. 2 intrarenal calculi are visible in the mid left kidney and may be located within a distended calyx or calyceal diverticulum. No intraperitoneal or subcutaneous abscess collections are identified." CT hip without contrast nonacute.  ED staff discussed with orthopedic surgery and neurosurgery with evaluation recommending plans for transfer to [facility] for higher level of care for orthopedic surgery hip service that was not available at this facility.  ED staff also discussed with Rheumatology at [facility], and after review of previous MRI and labs noted concerns for right hip joint effusion and recommended right hip joint aspiration.  Evaluation at the time noted low suspicion for rheumatologic etiology due to inflammatory markers with concerns of potential infection instead.  MRI Hip Without Contrast Right  noted concerns for "Infectious sacroiliitis on the right ".     Hospital Medicine consulted for medical management/admission while pending transfer.  "

## 2025-08-01 NOTE — PLAN OF CARE
[facility] - Med Surg  Discharge Assessment    Primary Care Provider: No, Primary Doctor     Discharge Assessment (most recent)       BRIEF DISCHARGE ASSESSMENT - 08/01/25 1158          Discharge Planning    Assessment Type Discharge Planning Brief Assessment     Resource/Environmental Concerns none     Support Systems Spouse/significant other     Equipment Currently Used at Home none     Current Living Arrangements home     Patient/Family Anticipates Transition to home     Patient/Family Anticipated Services at Transition none     DME Needed Upon Discharge  other (see comments)   TBD    Discharge Plan A Home with family     Discharge Plan B Home Health

## 2025-08-01 NOTE — ASSESSMENT & PLAN NOTE
Concerns for UTI due to abnormal UA on admission with leukocytosis on labs and reported back pain        Recent Labs   Lab 07/30/25  2321   COLORU Yellow   APPEARANCEUA Hazy*   PHUR 7.0   SPECGRAV 1.030   PROTEINUA 1+*   GLUCUA Negative   BILIRUBINUA Negative   OCCULTUA 3+*   NITRITE Positive*   UROBILINOGEN 2.0-3.0*   LEUKOCYTESUR 3+*   RBCUA >100*   WBCUA 62*   BACTERIA Moderate*   HYALINECASTS 0     PLAN  Followup urine cultures, urine gram stain  Antibiotics: Already on antibiotics for infectious sacroiliitis concerns

## 2025-08-01 NOTE — ASSESSMENT & PLAN NOTE
"Presented to ED for evaluation of worsening hip pain causing gait abnormalities.  She initially presented to [facility] on 07/12/25 with progressive worsening right-sided hip / buttock pain and dysuria which began while on a cruise.  See HPI for OSH hospital course,     ED course notable for labs with WBC 13.24, hemoglobin 11.2, MCV 80, platelets 599, ,  -CT hip without contrast nonacute.    -ED staff discussed with orthopedic surgery and neurosurgery with evaluation recommending plans for transfer to [facility] for higher level of care for orthopedic surgery hip service that was not available at this facility.    -ED staff also discussed with Rheumatology at [facility], and after review of previous MRI and labs noted concerns for right hip joint effusion and recommended right hip joint aspiration.  Evaluation at the time noted low suspicion for rheumatologic etiology due to inflammatory markers with concerns of potential infection instead.    -MRI Hip Without Contrast Right  noted concerns for "Infectious sacroiliitis on the right ".     PLAN:  -NPO at midnight  -Orthopedic surgery consulted, follow-up recs  -Interventional Radiology consulted for evaluation of fluid collection, follow-up recs  -Pain management  -Antibioitics  Antibiotics (From admission, onward)      Start     Stop Route Frequency Ordered    07/31/25 1815  piperacillin-tazobactam (ZOSYN) 4.5 g in D5W 100 mL IVPB (MB+)         -- IV Every 8 hours (non-standard times) 07/31/25 1705    07/31/25 1803  vancomycin - pharmacy to dose  (vancomycin IVPB (PEDS and ADULTS))        Placed in "And" Linked Group    -- IV pharmacy to manage frequency 07/31/25 1705    07/31/25 1730  vancomycin 2,000 mg in 0.9% NaCl 500 mL IVPB (admixture device)         08/01/25 0529 IV ED 1 Time 07/31/25 1726          "

## 2025-08-01 NOTE — PLAN OF CARE
See eval for details. Pt displayed deficits with functional mobility/ transfers, deficits with adl's skills also decrease b ue strength/endurance. Recommendation: high inytensity

## 2025-08-01 NOTE — CONSULTS
Chart reviewed by Dr. Phillips.       ASSESSMENT/PLAN:    Right pelvic fluid collection    The order for a ct aspiration has been placed and the procedure will be performed asap.          Thank you for the consult.

## 2025-08-02 LAB
ABSOLUTE EOSINOPHIL (OHS): 0.29 K/UL
ABSOLUTE MONOCYTE (OHS): 0.7 K/UL (ref 0.3–1)
ABSOLUTE NEUTROPHIL COUNT (OHS): 6.72 K/UL (ref 1.8–7.7)
ALBUMIN SERPL BCP-MCNC: 3.2 G/DL (ref 3.5–5.2)
ALP SERPL-CCNC: 62 UNIT/L (ref 40–150)
ALT SERPL W/O P-5'-P-CCNC: 13 UNIT/L (ref 0–55)
ANION GAP (OHS): 11 MMOL/L (ref 8–16)
AST SERPL-CCNC: 15 UNIT/L (ref 0–50)
BACTERIA UR CULT: ABNORMAL
BASOPHILS # BLD AUTO: 0.05 K/UL
BASOPHILS NFR BLD AUTO: 0.5 %
BILIRUB SERPL-MCNC: 0.3 MG/DL (ref 0.1–1)
BUN SERPL-MCNC: 13 MG/DL (ref 6–20)
CALCIUM SERPL-MCNC: 9.7 MG/DL (ref 8.7–10.5)
CHLORIDE SERPL-SCNC: 105 MMOL/L (ref 95–110)
CO2 SERPL-SCNC: 23 MMOL/L (ref 23–29)
CREAT SERPL-MCNC: 0.8 MG/DL (ref 0.5–1.4)
ERYTHROCYTE [DISTWIDTH] IN BLOOD BY AUTOMATED COUNT: 14.8 % (ref 11.5–14.5)
GFR SERPLBLD CREATININE-BSD FMLA CKD-EPI: >60 ML/MIN/1.73/M2
GLUCOSE SERPL-MCNC: 169 MG/DL (ref 70–110)
GRAM STN SPEC: NORMAL
GRAM STN SPEC: NORMAL
HCT VFR BLD AUTO: 31 % (ref 37–48.5)
HGB BLD-MCNC: 10 GM/DL (ref 12–16)
IMM GRANULOCYTES # BLD AUTO: 0.03 K/UL (ref 0–0.04)
IMM GRANULOCYTES NFR BLD AUTO: 0.3 % (ref 0–0.5)
LYMPHOCYTES # BLD AUTO: 1.71 K/UL (ref 1–4.8)
MCH RBC QN AUTO: 25.4 PG (ref 27–31)
MCHC RBC AUTO-ENTMCNC: 32.3 G/DL (ref 32–36)
MCV RBC AUTO: 79 FL (ref 82–98)
NUCLEATED RBC (/100WBC) (OHS): 0 /100 WBC
PLATELET # BLD AUTO: 491 K/UL (ref 150–450)
PMV BLD AUTO: 9.2 FL (ref 9.2–12.9)
POCT GLUCOSE: 115 MG/DL (ref 70–110)
POCT GLUCOSE: 118 MG/DL (ref 70–110)
POCT GLUCOSE: 121 MG/DL (ref 70–110)
POCT GLUCOSE: 136 MG/DL (ref 70–110)
POCT GLUCOSE: 213 MG/DL (ref 70–110)
POTASSIUM SERPL-SCNC: 3.9 MMOL/L (ref 3.5–5.1)
PROT SERPL-MCNC: 7.3 GM/DL (ref 6–8.4)
RBC # BLD AUTO: 3.93 M/UL (ref 4–5.4)
RELATIVE EOSINOPHIL (OHS): 3.1 %
RELATIVE LYMPHOCYTE (OHS): 18 % (ref 18–48)
RELATIVE MONOCYTE (OHS): 7.4 % (ref 4–15)
RELATIVE NEUTROPHIL (OHS): 70.7 % (ref 38–73)
SODIUM SERPL-SCNC: 139 MMOL/L (ref 136–145)
TROPONIN I SERPL HS-MCNC: <3 NG/L
VANCOMYCIN SERPL-MCNC: <1.4 UG/ML (ref ?–80)
WBC # BLD AUTO: 9.5 K/UL (ref 3.9–12.7)

## 2025-08-02 PROCEDURE — 80053 COMPREHEN METABOLIC PANEL: CPT

## 2025-08-02 PROCEDURE — 63600175 PHARM REV CODE 636 W HCPCS: Performed by: INTERNAL MEDICINE

## 2025-08-02 PROCEDURE — 36415 COLL VENOUS BLD VENIPUNCTURE: CPT

## 2025-08-02 PROCEDURE — 11000001 HC ACUTE MED/SURG PRIVATE ROOM

## 2025-08-02 PROCEDURE — 25000003 PHARM REV CODE 250

## 2025-08-02 PROCEDURE — A9585 GADOBUTROL INJECTION: HCPCS | Performed by: INTERNAL MEDICINE

## 2025-08-02 PROCEDURE — 63600175 PHARM REV CODE 636 W HCPCS

## 2025-08-02 PROCEDURE — 85025 COMPLETE CBC W/AUTO DIFF WBC: CPT

## 2025-08-02 PROCEDURE — 80202 ASSAY OF VANCOMYCIN: CPT | Performed by: INTERNAL MEDICINE

## 2025-08-02 PROCEDURE — 99232 SBSQ HOSP IP/OBS MODERATE 35: CPT | Mod: ,,, | Performed by: STUDENT IN AN ORGANIZED HEALTH CARE EDUCATION/TRAINING PROGRAM

## 2025-08-02 PROCEDURE — 25000003 PHARM REV CODE 250: Performed by: INTERNAL MEDICINE

## 2025-08-02 PROCEDURE — 84484 ASSAY OF TROPONIN QUANT: CPT

## 2025-08-02 PROCEDURE — 25500020 PHARM REV CODE 255: Performed by: INTERNAL MEDICINE

## 2025-08-02 RX ORDER — METHOCARBAMOL 500 MG/1
500 TABLET, FILM COATED ORAL 3 TIMES DAILY
Status: CANCELLED | OUTPATIENT
Start: 2025-08-02

## 2025-08-02 RX ORDER — IBUPROFEN 200 MG
24 TABLET ORAL
Status: DISCONTINUED | OUTPATIENT
Start: 2025-08-02 | End: 2025-08-03

## 2025-08-02 RX ORDER — ACETAMINOPHEN 325 MG/1
650 TABLET ORAL EVERY 6 HOURS PRN
Status: CANCELLED | OUTPATIENT
Start: 2025-08-02

## 2025-08-02 RX ORDER — OXYCODONE HYDROCHLORIDE 10 MG/1
10 TABLET ORAL EVERY 4 HOURS PRN
Refills: 0 | Status: CANCELLED | OUTPATIENT
Start: 2025-08-02

## 2025-08-02 RX ORDER — GABAPENTIN 300 MG/1
300 CAPSULE ORAL 3 TIMES DAILY
Status: CANCELLED | OUTPATIENT
Start: 2025-08-02

## 2025-08-02 RX ORDER — HYDROMORPHONE HYDROCHLORIDE 1 MG/ML
1 INJECTION, SOLUTION INTRAMUSCULAR; INTRAVENOUS; SUBCUTANEOUS EVERY 6 HOURS PRN
Status: DISCONTINUED | OUTPATIENT
Start: 2025-08-02 | End: 2025-08-07 | Stop reason: HOSPADM

## 2025-08-02 RX ORDER — GLUCAGON 1 MG
1 KIT INJECTION
Status: DISCONTINUED | OUTPATIENT
Start: 2025-08-02 | End: 2025-08-03

## 2025-08-02 RX ORDER — INSULIN ASPART 100 [IU]/ML
0-5 INJECTION, SOLUTION INTRAVENOUS; SUBCUTANEOUS
Status: DISCONTINUED | OUTPATIENT
Start: 2025-08-02 | End: 2025-08-07 | Stop reason: HOSPADM

## 2025-08-02 RX ORDER — LIDOCAINE 50 MG/G
1 PATCH TOPICAL
Status: CANCELLED | OUTPATIENT
Start: 2025-08-02

## 2025-08-02 RX ORDER — OXYCODONE HYDROCHLORIDE 10 MG/1
10 TABLET ORAL EVERY 4 HOURS PRN
Refills: 0 | Status: DISCONTINUED | OUTPATIENT
Start: 2025-08-02 | End: 2025-08-07 | Stop reason: HOSPADM

## 2025-08-02 RX ORDER — GADOBUTROL 604.72 MG/ML
10 INJECTION INTRAVENOUS
Status: COMPLETED | OUTPATIENT
Start: 2025-08-02 | End: 2025-08-02

## 2025-08-02 RX ORDER — OXYCODONE HCL 10 MG/1
10 TABLET, FILM COATED, EXTENDED RELEASE ORAL EVERY 12 HOURS
Refills: 0 | Status: DISCONTINUED | OUTPATIENT
Start: 2025-08-02 | End: 2025-08-07 | Stop reason: HOSPADM

## 2025-08-02 RX ORDER — ENOXAPARIN SODIUM 100 MG/ML
40 INJECTION SUBCUTANEOUS 2 TIMES DAILY
Status: DISCONTINUED | OUTPATIENT
Start: 2025-08-02 | End: 2025-08-03

## 2025-08-02 RX ORDER — IBUPROFEN 200 MG
24 TABLET ORAL
Status: DISCONTINUED | OUTPATIENT
Start: 2025-08-02 | End: 2025-08-07 | Stop reason: HOSPADM

## 2025-08-02 RX ORDER — GLUCAGON 1 MG
1 KIT INJECTION
Status: DISCONTINUED | OUTPATIENT
Start: 2025-08-02 | End: 2025-08-07 | Stop reason: HOSPADM

## 2025-08-02 RX ORDER — IBUPROFEN 200 MG
16 TABLET ORAL
Status: DISCONTINUED | OUTPATIENT
Start: 2025-08-02 | End: 2025-08-03

## 2025-08-02 RX ORDER — ACETAMINOPHEN 325 MG/1
650 TABLET ORAL EVERY 6 HOURS PRN
Status: DISCONTINUED | OUTPATIENT
Start: 2025-08-02 | End: 2025-08-07 | Stop reason: HOSPADM

## 2025-08-02 RX ORDER — METHOCARBAMOL 500 MG/1
500 TABLET, FILM COATED ORAL 3 TIMES DAILY
Status: DISCONTINUED | OUTPATIENT
Start: 2025-08-02 | End: 2025-08-07 | Stop reason: HOSPADM

## 2025-08-02 RX ORDER — IBUPROFEN 200 MG
16 TABLET ORAL
Status: DISCONTINUED | OUTPATIENT
Start: 2025-08-02 | End: 2025-08-07 | Stop reason: HOSPADM

## 2025-08-02 RX ORDER — LIDOCAINE 50 MG/G
1 PATCH TOPICAL
Status: DISCONTINUED | OUTPATIENT
Start: 2025-08-02 | End: 2025-08-07 | Stop reason: HOSPADM

## 2025-08-02 RX ORDER — HYDROMORPHONE HYDROCHLORIDE 1 MG/ML
0.5 INJECTION, SOLUTION INTRAMUSCULAR; INTRAVENOUS; SUBCUTANEOUS EVERY 6 HOURS PRN
Status: DISCONTINUED | OUTPATIENT
Start: 2025-08-02 | End: 2025-08-02

## 2025-08-02 RX ADMIN — ENOXAPARIN SODIUM 40 MG: 40 INJECTION SUBCUTANEOUS at 09:08

## 2025-08-02 RX ADMIN — AMLODIPINE BESYLATE 10 MG: 10 TABLET ORAL at 09:08

## 2025-08-02 RX ADMIN — GABAPENTIN 300 MG: 300 CAPSULE ORAL at 09:08

## 2025-08-02 RX ADMIN — PIPERACILLIN SODIUM AND TAZOBACTAM SODIUM 4.5 G: 4; .5 INJECTION, POWDER, FOR SOLUTION INTRAVENOUS at 10:08

## 2025-08-02 RX ADMIN — ENOXAPARIN SODIUM 40 MG: 40 INJECTION SUBCUTANEOUS at 12:08

## 2025-08-02 RX ADMIN — HYDROMORPHONE HYDROCHLORIDE 1 MG: 1 INJECTION, SOLUTION INTRAMUSCULAR; INTRAVENOUS; SUBCUTANEOUS at 07:08

## 2025-08-02 RX ADMIN — ATORVASTATIN CALCIUM 40 MG: 40 TABLET, FILM COATED ORAL at 09:08

## 2025-08-02 RX ADMIN — OXYCODONE HYDROCHLORIDE 10 MG: 10 TABLET, FILM COATED, EXTENDED RELEASE ORAL at 10:08

## 2025-08-02 RX ADMIN — LIDOCAINE 1 PATCH: 50 PATCH CUTANEOUS at 12:08

## 2025-08-02 RX ADMIN — PIPERACILLIN SODIUM AND TAZOBACTAM SODIUM 4.5 G: 4; .5 INJECTION, POWDER, FOR SOLUTION INTRAVENOUS at 01:08

## 2025-08-02 RX ADMIN — GADOBUTROL 10 ML: 604.72 INJECTION INTRAVENOUS at 11:08

## 2025-08-02 RX ADMIN — HYDROMORPHONE HYDROCHLORIDE 0.5 MG: 1 INJECTION, SOLUTION INTRAMUSCULAR; INTRAVENOUS; SUBCUTANEOUS at 06:08

## 2025-08-02 RX ADMIN — OXYCODONE HYDROCHLORIDE 10 MG: 10 TABLET, FILM COATED, EXTENDED RELEASE ORAL at 09:08

## 2025-08-02 RX ADMIN — SODIUM CHLORIDE 2500 MG: 9 INJECTION, SOLUTION INTRAVENOUS at 10:08

## 2025-08-02 RX ADMIN — METHOCARBAMOL 500 MG: 500 TABLET ORAL at 04:08

## 2025-08-02 RX ADMIN — GABAPENTIN 300 MG: 300 CAPSULE ORAL at 10:08

## 2025-08-02 RX ADMIN — OXYCODONE HYDROCHLORIDE AND ACETAMINOPHEN 1 TABLET: 5; 325 TABLET ORAL at 06:08

## 2025-08-02 RX ADMIN — GABAPENTIN 300 MG: 300 CAPSULE ORAL at 03:08

## 2025-08-02 RX ADMIN — HYDROMORPHONE HYDROCHLORIDE 1 MG: 1 INJECTION, SOLUTION INTRAMUSCULAR; INTRAVENOUS; SUBCUTANEOUS at 01:08

## 2025-08-02 RX ADMIN — ASPIRIN 81 MG: 81 TABLET, DELAYED RELEASE ORAL at 06:08

## 2025-08-02 RX ADMIN — METHOCARBAMOL 500 MG: 500 TABLET ORAL at 09:08

## 2025-08-02 RX ADMIN — HEPARIN SODIUM 7500 UNITS: 5000 INJECTION INTRAVENOUS; SUBCUTANEOUS at 06:08

## 2025-08-02 RX ADMIN — OXYCODONE HYDROCHLORIDE 10 MG: 10 TABLET ORAL at 04:08

## 2025-08-02 RX ADMIN — OXYCODONE HYDROCHLORIDE AND ACETAMINOPHEN 1 TABLET: 5; 325 TABLET ORAL at 12:08

## 2025-08-02 RX ADMIN — INSULIN ASPART 2 UNITS: 100 INJECTION, SOLUTION INTRAVENOUS; SUBCUTANEOUS at 06:08

## 2025-08-02 RX ADMIN — VANCOMYCIN HYDROCHLORIDE 1500 MG: 1.5 INJECTION, POWDER, LYOPHILIZED, FOR SOLUTION INTRAVENOUS at 06:08

## 2025-08-02 NOTE — DISCHARGE SUMMARY
"[facility] [place] Medicine  Discharge Summary      Patient Name: Missy Grayson  MRN: [id number]  CORY: [id number]  Patient Class: IP- Inpatient  Admission Date: 7/30/2025  Hospital Length of Stay: 1 days  Discharge Date and Time: 8/1/2025  7:59 PM  Attending Physician: Lennie att. providers found   Discharging Provider: King Hogan DO  Primary Care Provider: Lennie, Primary Doctor    Primary Care Team: Networked reference to record PCT     HPI:   Ms. Missy Grayson is a 40 y.o. female who  has a medical history of T2DM (A1c 7.4%), hyperlipidemia, normocytic anemia, and hypertension    She  presented to ED for evaluation of worsening hip pain causing gait abnormalities.  She initially presented to [facility] on 07/12/25 with progressive worsening right-sided hip / buttock pain and dysuria which began while on a cruise.  Per chart review of [facility] hospital course,     "On admission, found to have leukocytosis (WBC 21.4), JOSEFINA band 3. Workup concerning for sepsis from urinary source. Urine culture with > 100,000 CFU Klebsiella pneumoniae. Completed 7 day course of ceftriaxone with resolution of UTI on repeat UA 7/18. Course also complicated by severe right hip pain limiting mobility and requiring significant pain regimen. MRI on 7/14 with concern for possible septic R SI joint, however, IR aspiration unsuccessful. Patient received 5 days vancomycin for suspected SI joint infection, though blood cultures were negative, lowering our suspicion of infected joint. Patient spiked a fever, prompting repeat imaging. MRI of R-hip had poor visualization of SI joint, but CT scan showed no fluid collection or bony destruction of the joint. Highly suspicious that her pain is a variation of reactive arthritis. CT scan was, however, significant for a large round calcified lesion in patient's liver as well as left kidney. Urology stated that findings are not consistent with renal calculi, thus low suspicion for infectious etiology. " "Patient's right hip pain is likely secondary to mechanical fall vs. autoimmune vs. reactive arthritis. Rheumatology was consulted and initiated a short course of high-dose steroids and will follow up outpatient. Patient stable upon discharge. Will follow in outpatient setting. "    She reports since discharge her pain has been worsening and now she is having difficulty walking due to stabbing pain.  The pain starts in her right lower hip area and radiates down her thigh.  Reports tenderness to palpation in right lower hip area.  She reports the symptoms initially started when she was on a cruise about 3 weeks ago.  She was feeling nauseated so she went to the restroom for vomiting and when she went to the restroom, she fell onto her knees.  The next morning when she woke up she had significant difficulty with ambulation and was limping throughout the day and even required wheelchair for ambulation assistance due to severity of her symptoms.  She reports the pain symptoms have been persistent since then and denies any alleviation.      ED course notable for hemodynamically stable vitals.  Labs with WBC 13.24, hemoglobin 11.2, MCV 80, platelets 599, , UA with WBC 62/ RBC >100/ Leukocyte esterase 3+, Nitrite positive.  CT abdomen pelvis with IV contrast noted " Status post cholecystectomy. 2 intrarenal calculi are visible in the mid left kidney and may be located within a distended calyx or calyceal diverticulum. No intraperitoneal or subcutaneous abscess collections are identified." CT hip without contrast nonacute.  ED staff discussed with orthopedic surgery and neurosurgery with evaluation recommending plans for transfer to [facility] for higher level of care for orthopedic surgery hip service that was not available at this facility.  ED staff also discussed with Rheumatology at [facility], and after review of previous MRI and labs noted concerns for right hip joint effusion and recommended right hip joint " "aspiration.  Evaluation at the time noted low suspicion for rheumatologic etiology due to inflammatory markers with concerns of potential infection instead.  MRI Hip Without Contrast Right  noted concerns for "Infectious sacroiliitis on the right ".     Hospital Medicine consulted for medical management/admission while pending transfer.    * No surgery found *      Hospital Course:   Admitted to Hospital Medicine for back pain concerns while pending transfer for orthopedic surgery evaluation of infectious sacroiliitis concerns.  Started on broad-spectrum antibiotics with vancomycin/piperacillin-tazobactam.  Interventional Radiology consulted and patient underwent aspiration of fluid collection concerns, fluid studies pending.  Urine cultures concerning for Gram-negative Rods (Identification and susceptibility pending). PT/OT recs for high intensity therapy. Discussed with orthopedic surgery, recommendations for transfer for evaluation at facility with hip surgery services available.  Patient unable to be transferred to [facility] but accepted to [facility]    Discharge plan of care was discussed at length with patient. All questions and concerns were answered. Patient was in agreement with plan of care going forward. Patient continued to remain afebrile and hemodynamically stable. Patient seen and examined on the day of transfer. Patient deemed stable for transfer to  [facility] for evaluation at higher level of care.      Goals of Care Treatment Preferences:  Code Status: Full Code         Consults:   Consults (From admission, onward)          Status Ordering Provider     Inpatient consult to Interventional Radiology  Once        Provider:  Helder Phillips MD    Completed ROBBY ANDERSON     Pharmacy to dose Vancomycin consult  Once        Provider:  (Not yet assigned)   Placed in "And" Linked Group    Acknowledged ROBBY ANDERSON            Final Active Diagnoses:    Diagnosis Date Noted POA    PRINCIPAL PROBLEM:  " Sacroiliitis [M46.1] 07/15/2025 Yes    Tobacco dependency [F17.200] 08/01/2025 Unknown    UTI (urinary tract infection) [N39.0] 07/31/2025 Yes    Right leg weakness [R29.898] 07/18/2025 Yes    Right hip joint effusion [M25.451] 07/18/2025 Yes    Effusion of sacroiliac joint [M25.48] 07/18/2025 Yes    Type 2 diabetes mellitus, without long-term current use of insulin [E11.9] 07/12/2025 Yes    Renal calculus, left [N20.0] 07/12/2025 Yes    Hypertension [I10] 07/12/2025 Yes    Hyperlipidemia [E78.5] 07/12/2025 Yes      Problems Resolved During this Admission:       Discharged Condition: stable    Disposition: Another Health Care Inst*        Significant Labs:   Recent Labs   Lab 07/30/25 2213 07/31/25 2127 08/01/25  0550    140 139   K 3.7 3.8 3.9    105 106   CO2 24 23 24   BUN 20 14 15   CREATININE 0.8 0.7 0.7   * 117* 108   ANIONGAP 13 12 9     Recent Labs   Lab 07/31/25 2127 08/01/25  0550   MG 1.8 1.8   PHOS 3.7 4.7*     Recent Labs   Lab 07/30/25 2213 07/31/25 2127 08/01/25  0550   AST 11 14 11   ALT 13 12 10   ALKPHOS 79 66 62   BILITOT 0.3 0.4 0.3   ALBUMIN 3.5 3.6 3.0*     POCT Glucose:   Recent Labs   Lab 07/31/25  0930 08/01/25  0029 08/01/25  0617   POCTGLUCOSE 99 211* 123*    Recent Labs   Lab 07/30/25 2213 07/31/25 2127 08/01/25  0550   WBC 13.24* 9.97 9.93   HGB 11.2* 11.5* 10.0*   HCT 35.4* 36.0* 32.0*   * 581* 509*              Microbiology Results (last 7 days)       Procedure Component Value Units Date/Time    Blood culture x two cultures. Draw prior to antibiotics. [[id number]]  (Normal) Collected: 07/30/25 2213    Order Status: Completed Specimen: Blood from Peripheral, Antecubital, Right Updated: 08/01/25 1300     Blood Culture No Growth After 24 Hours    Blood culture x two cultures. Draw prior to antibiotics. [[id number]]  (Normal) Collected: 07/30/25 2216    Order Status: Completed Specimen: Blood from Peripheral, Upper Arm, Right Updated: 08/01/25 1300      Blood Culture No Growth After 24 Hours    Gram stain [[id number]] Collected: 08/01/25 1142    Order Status: Sent Specimen: Abscess from Pelvis Updated: 08/01/25 1151    Culture, Anaerobic [[id number]] Collected: 08/01/25 1142    Order Status: Sent Specimen: Abscess from Pelvis Updated: 08/01/25 1151    Aerobic culture [[id number]] Collected: 08/01/25 1142    Order Status: Sent Specimen: Abscess from Pelvis Updated: 08/01/25 1151    Urine culture [[id number]]  (Abnormal) Collected: 07/30/25 2321    Order Status: Completed Specimen: Urine Updated: 08/01/25 1140     Urine Culture >100,000 cfu/ml Gram-negative Rods     Comment: Identification and susceptibility pending                 Significant Imaging:   CT Guided Needle Placement   Final Result      CT-guided aspiration in the area of the very small collection seen on the MRI hip performed yesterday.  Small amount of sanguinous fluid was aspirated and submitted.  It is not sure if this sample provide an adequate result.  No larger collection is available.  Bone biopsy could be performed and submitted for culture and Gram stain; however this is often low yield.  Please re-consult IR if further samples are required.         Electronically signed by: Helder Rodas   Date:    08/01/2025   Time:    12:40      MRI Hip Without Contrast Right   Final Result    Infectious sacroiliitis on the right as described above.      Finalized on: 7/31/2025 9:35 PM By:  Taj Duran   [facility]# [id number]      2025-07-31 21:37:44.188     [facility]      CT Abdomen Pelvis With IV Contrast NO Oral Contrast   Final Result         1.  Status post cholecystectomy.   2.  2 intrarenal calculi are visible in the mid left kidney and may be located within a distended calyx or calyceal diverticulum.   3.  No intraperitoneal or subcutaneous abscess collections are identified.      All CT scans at this location are performed using dose modulation techniques as appropriate to a performed exam including the  following: Automated exposure control; adjustment of the mA and/or kV according to patient size (this includes techniques or standardized protocols for targeted exams where dose is matched to indication / reason for exam; i.e. extremities or head); use of iterative reconstruction technique.      Finalized on: 7/31/2025 7:58 AM By:  Tony Love   [facility]# [id number]      2025-07-31 08:00:15.050     [facility]      CT Hip Without Contrast Right   Final Result    No CT abnormalities are appreciated at the right hip.      All CT scans at [this location] are performed using dose modulation techniques as appropriate to a performed exam including the following:  Automated exposure control; adjustment of the mA and/or kV according to patient size (this includes techniques or standardized protocols for targeted exams where dose is matched to indication / reason for exam; i.e. extremities or head); use of iterative reconstruction technique.      Finalized on: 7/31/2025 8:01 AM By:  Tony Love   [facility]# [id number]      2025-07-31 08:03:35.234     [facility]      CT Head Without Contrast   Final Result    No acute intracranial abnormalities are identified.      All CT scans at this location are performed using dose modulation techniques as appropriate to a performed exam including the following: Automated exposure control; adjustment of the mA and/or kV according to patient size (this includes techniques or standardized protocols for targeted exams where dose is matched to indication / reason for exam; i.e. extremities or head); use of iterative reconstruction technique.      Finalized on: 7/31/2025 7:58 AM By:  Tony Love   [facility]# [id number]      2025-07-31 08:01:05.079     [facility]      X-Ray Chest AP Portable   Final Result      No acute finding in the chest.         Electronically signed by: Helder Rodas   Date:    07/31/2025   Time:    08:24          Significant Cardiac Procedures      Results for orders placed or  performed during the hospital encounter of 07/30/25   EKG 12-lead    Collection Time: 07/30/25 10:13 PM   Result Value Ref Range    QRS Duration 86 ms    [facility] QTC Calculation 435 ms    Narrative    Test Reason : Z13.6,    Vent. Rate :  88 BPM     Atrial Rate :  88 BPM     P-R Int : 174 ms          QRS Dur :  86 ms      QT Int : 360 ms       P-R-T Axes :  31  -5  10 degrees    QTcB Int : 435 ms    Normal sinus rhythm  Minimal voltage criteria for LVH, may be normal variant ( R in aVL )  Cannot rule out Anterior infarct ,age undetermined  Abnormal ECG  No previous ECGs available  Confirmed by Carlos Rodriguez ([id number]) on 7/31/2025 9:53:03 PM    Referred By: AAAREFERRAL SELF           Confirmed By: Carlos Rodriguez         Pending Diagnostic Studies:       None           Medications:  None. Final med reconciliation by final discharging facility.    Indwelling Lines/Drains at time of discharge:   Lines/Drains/Airways       None                       Time spent on the discharge of patient: 45 minutes         King Hogan DO  Department of Hospital Medicine  [facility]    Voice recognition software was used in the creation of this note/communication and any sound-alike/typographical errors which may have occurred despite initial review prior to signing should be taken in context when interpreting.  If such errors prevent a clear understanding of the note/communication, please contact the provider/office for clarification.

## 2025-08-02 NOTE — PLAN OF CARE
Pt being discharged [facility] in stable condition. IV removed, catheter intact, pt tolerated well. Tele monitor removed, given to US. Discharge instructions given to pt, pt verbalized understanding.

## 2025-08-02 NOTE — HOSPITAL COURSE
Admitted to Hospital Medicine for back pain concerns while pending transfer for orthopedic surgery evaluation of infectious sacroiliitis concerns.  Started on broad-spectrum antibiotics with vancomycin/piperacillin-tazobactam.  Interventional Radiology consulted and patient underwent aspiration of fluid collection concerns, fluid studies pending.  Urine cultures concerning for Gram-negative Rods (Identification and susceptibility pending). PT/OT recs for high intensity therapy. Discussed with orthopedic surgery, recommendations for transfer for evaluation at facility with hip surgery services available.  Patient unable to be transferred to [facility] but accepted to [facility]    Discharge plan of care was discussed at length with patient. All questions and concerns were answered. Patient was in agreement with plan of care going forward. Patient continued to remain afebrile and hemodynamically stable. Patient seen and examined on the day of transfer. Patient deemed stable for transfer to  [facility] for evaluation at higher level of care.

## 2025-08-03 PROBLEM — M25.551 ACUTE RIGHT HIP PAIN: Status: ACTIVE | Noted: 2025-08-03

## 2025-08-03 PROBLEM — N94.819 VULVODYNIA: Status: ACTIVE | Noted: 2025-08-03

## 2025-08-03 PROBLEM — B37.31 YEAST INFECTION INVOLVING THE VAGINA AND SURROUNDING AREA: Status: ACTIVE | Noted: 2025-08-03

## 2025-08-03 PROBLEM — R30.0 DYSURIA: Status: ACTIVE | Noted: 2025-08-03

## 2025-08-03 LAB
ALBUMIN SERPL BCP-MCNC: 3.4 G/DL (ref 3.5–5.2)
ALP SERPL-CCNC: 69 UNIT/L (ref 40–150)
ALT SERPL W/O P-5'-P-CCNC: 14 UNIT/L (ref 0–55)
ANION GAP (OHS): 9 MMOL/L (ref 8–16)
AST SERPL-CCNC: 12 UNIT/L (ref 0–50)
BILIRUB SERPL-MCNC: 0.3 MG/DL (ref 0.1–1)
BUN SERPL-MCNC: 11 MG/DL (ref 6–20)
CALCIUM SERPL-MCNC: 10.2 MG/DL (ref 8.7–10.5)
CHLORIDE SERPL-SCNC: 103 MMOL/L (ref 95–110)
CO2 SERPL-SCNC: 25 MMOL/L (ref 23–29)
CREAT SERPL-MCNC: 0.9 MG/DL (ref 0.5–1.4)
CRP SERPL-MCNC: 46.1 MG/L
GFR SERPLBLD CREATININE-BSD FMLA CKD-EPI: >60 ML/MIN/1.73/M2
GLUCOSE SERPL-MCNC: 159 MG/DL (ref 70–110)
POCT GLUCOSE: 145 MG/DL (ref 70–110)
POCT GLUCOSE: 168 MG/DL (ref 70–110)
POTASSIUM SERPL-SCNC: 4 MMOL/L (ref 3.5–5.1)
PROT SERPL-MCNC: 7.7 GM/DL (ref 6–8.4)
SODIUM SERPL-SCNC: 137 MMOL/L (ref 136–145)
VANCOMYCIN TROUGH SERPL-MCNC: 15.4 UG/ML (ref 10–22)

## 2025-08-03 PROCEDURE — 97162 PT EVAL MOD COMPLEX 30 MIN: CPT

## 2025-08-03 PROCEDURE — 97116 GAIT TRAINING THERAPY: CPT

## 2025-08-03 PROCEDURE — 36415 COLL VENOUS BLD VENIPUNCTURE: CPT

## 2025-08-03 PROCEDURE — 25000003 PHARM REV CODE 250: Performed by: PHYSICIAN ASSISTANT

## 2025-08-03 PROCEDURE — 63600175 PHARM REV CODE 636 W HCPCS

## 2025-08-03 PROCEDURE — 11000001 HC ACUTE MED/SURG PRIVATE ROOM

## 2025-08-03 PROCEDURE — 97530 THERAPEUTIC ACTIVITIES: CPT

## 2025-08-03 PROCEDURE — 80053 COMPREHEN METABOLIC PANEL: CPT

## 2025-08-03 PROCEDURE — 25000003 PHARM REV CODE 250: Performed by: INTERNAL MEDICINE

## 2025-08-03 PROCEDURE — 63600175 PHARM REV CODE 636 W HCPCS: Performed by: PHYSICIAN ASSISTANT

## 2025-08-03 PROCEDURE — 25000003 PHARM REV CODE 250

## 2025-08-03 PROCEDURE — 99223 1ST HOSP IP/OBS HIGH 75: CPT | Mod: ,,, | Performed by: PHYSICIAN ASSISTANT

## 2025-08-03 PROCEDURE — G0545 PR VISIT INHERENT TO INPT OR OBS CARE, INFECTIOUS DISEASE: HCPCS | Mod: ,,, | Performed by: PHYSICIAN ASSISTANT

## 2025-08-03 PROCEDURE — 80202 ASSAY OF VANCOMYCIN: CPT | Performed by: INTERNAL MEDICINE

## 2025-08-03 PROCEDURE — 86140 C-REACTIVE PROTEIN: CPT | Performed by: PHYSICIAN ASSISTANT

## 2025-08-03 PROCEDURE — 97166 OT EVAL MOD COMPLEX 45 MIN: CPT

## 2025-08-03 PROCEDURE — 63600175 PHARM REV CODE 636 W HCPCS: Performed by: INTERNAL MEDICINE

## 2025-08-03 RX ORDER — CEFTRIAXONE 2 G/1
2 INJECTION, POWDER, FOR SOLUTION INTRAMUSCULAR; INTRAVENOUS
Status: DISCONTINUED | OUTPATIENT
Start: 2025-08-03 | End: 2025-08-06

## 2025-08-03 RX ORDER — FLUCONAZOLE 150 MG/1
150 TABLET ORAL ONCE
Status: COMPLETED | OUTPATIENT
Start: 2025-08-03 | End: 2025-08-03

## 2025-08-03 RX ORDER — ASPIRIN 325 MG
1 TABLET, DELAYED RELEASE (ENTERIC COATED) ORAL NIGHTLY
Status: DISCONTINUED | OUTPATIENT
Start: 2025-08-03 | End: 2025-08-07 | Stop reason: HOSPADM

## 2025-08-03 RX ORDER — ENOXAPARIN SODIUM 100 MG/ML
60 INJECTION SUBCUTANEOUS 2 TIMES DAILY
Status: DISCONTINUED | OUTPATIENT
Start: 2025-08-03 | End: 2025-08-04

## 2025-08-03 RX ADMIN — FLUCONAZOLE 150 MG: 150 TABLET ORAL at 03:08

## 2025-08-03 RX ADMIN — CLOTRIMAZOLE 1 APPLICATOR: 1 CREAM VAGINAL at 08:08

## 2025-08-03 RX ADMIN — VANCOMYCIN HYDROCHLORIDE 1500 MG: 1.5 INJECTION, POWDER, LYOPHILIZED, FOR SOLUTION INTRAVENOUS at 06:08

## 2025-08-03 RX ADMIN — OXYCODONE HYDROCHLORIDE 10 MG: 10 TABLET ORAL at 12:08

## 2025-08-03 RX ADMIN — HYDROMORPHONE HYDROCHLORIDE 1 MG: 1 INJECTION, SOLUTION INTRAMUSCULAR; INTRAVENOUS; SUBCUTANEOUS at 02:08

## 2025-08-03 RX ADMIN — OXYCODONE HYDROCHLORIDE 10 MG: 10 TABLET ORAL at 08:08

## 2025-08-03 RX ADMIN — OXYCODONE HYDROCHLORIDE 10 MG: 10 TABLET ORAL at 05:08

## 2025-08-03 RX ADMIN — METHOCARBAMOL 500 MG: 500 TABLET ORAL at 08:08

## 2025-08-03 RX ADMIN — PIPERACILLIN SODIUM AND TAZOBACTAM SODIUM 4.5 G: 4; .5 INJECTION, POWDER, FOR SOLUTION INTRAVENOUS at 01:08

## 2025-08-03 RX ADMIN — HYDROMORPHONE HYDROCHLORIDE 1 MG: 1 INJECTION, SOLUTION INTRAMUSCULAR; INTRAVENOUS; SUBCUTANEOUS at 10:08

## 2025-08-03 RX ADMIN — ENOXAPARIN SODIUM 60 MG: 60 INJECTION SUBCUTANEOUS at 08:08

## 2025-08-03 RX ADMIN — OXYCODONE HYDROCHLORIDE 10 MG: 10 TABLET, FILM COATED, EXTENDED RELEASE ORAL at 08:08

## 2025-08-03 RX ADMIN — GABAPENTIN 300 MG: 300 CAPSULE ORAL at 08:08

## 2025-08-03 RX ADMIN — CEFTRIAXONE SODIUM 2 G: 2 INJECTION, POWDER, FOR SOLUTION INTRAMUSCULAR; INTRAVENOUS at 08:08

## 2025-08-03 RX ADMIN — LIDOCAINE 1 PATCH: 50 PATCH CUTANEOUS at 12:08

## 2025-08-03 RX ADMIN — HYDROMORPHONE HYDROCHLORIDE 1 MG: 1 INJECTION, SOLUTION INTRAMUSCULAR; INTRAVENOUS; SUBCUTANEOUS at 05:08

## 2025-08-03 RX ADMIN — ATORVASTATIN CALCIUM 40 MG: 40 TABLET, FILM COATED ORAL at 08:08

## 2025-08-03 RX ADMIN — GABAPENTIN 300 MG: 300 CAPSULE ORAL at 03:08

## 2025-08-03 RX ADMIN — ASPIRIN 81 MG: 81 TABLET, DELAYED RELEASE ORAL at 06:08

## 2025-08-03 RX ADMIN — METHOCARBAMOL 500 MG: 500 TABLET ORAL at 03:08

## 2025-08-03 RX ADMIN — AMLODIPINE BESYLATE 10 MG: 10 TABLET ORAL at 08:08

## 2025-08-03 RX ADMIN — VANCOMYCIN HYDROCHLORIDE 1500 MG: 1.5 INJECTION, POWDER, LYOPHILIZED, FOR SOLUTION INTRAVENOUS at 03:08

## 2025-08-03 RX ADMIN — VANCOMYCIN HYDROCHLORIDE 1500 MG: 1.5 INJECTION, POWDER, LYOPHILIZED, FOR SOLUTION INTRAVENOUS at 10:08

## 2025-08-03 RX ADMIN — PIPERACILLIN SODIUM AND TAZOBACTAM SODIUM 4.5 G: 4; .5 INJECTION, POWDER, FOR SOLUTION INTRAVENOUS at 05:08

## 2025-08-04 LAB
ALBUMIN SERPL BCP-MCNC: 3.1 G/DL (ref 3.5–5.2)
ALP SERPL-CCNC: 60 UNIT/L (ref 40–150)
ALT SERPL W/O P-5'-P-CCNC: 12 UNIT/L (ref 0–55)
ANION GAP (OHS): 9 MMOL/L (ref 8–16)
AST SERPL-CCNC: 11 UNIT/L (ref 0–50)
BILIRUB SERPL-MCNC: 0.2 MG/DL (ref 0.1–1)
BUN SERPL-MCNC: 11 MG/DL (ref 6–20)
CALCIUM SERPL-MCNC: 9.5 MG/DL (ref 8.7–10.5)
CHLORIDE SERPL-SCNC: 104 MMOL/L (ref 95–110)
CO2 SERPL-SCNC: 24 MMOL/L (ref 23–29)
CREAT SERPL-MCNC: 0.7 MG/DL (ref 0.5–1.4)
GFR SERPLBLD CREATININE-BSD FMLA CKD-EPI: >60 ML/MIN/1.73/M2
GLUCOSE SERPL-MCNC: 122 MG/DL (ref 70–110)
POCT GLUCOSE: 132 MG/DL (ref 70–110)
POCT GLUCOSE: 165 MG/DL (ref 70–110)
POCT GLUCOSE: 207 MG/DL (ref 70–110)
POCT GLUCOSE: 211 MG/DL (ref 70–110)
POTASSIUM SERPL-SCNC: 3.9 MMOL/L (ref 3.5–5.1)
PROT SERPL-MCNC: 7.1 GM/DL (ref 6–8.4)
SODIUM SERPL-SCNC: 137 MMOL/L (ref 136–145)
VANCOMYCIN TROUGH SERPL-MCNC: 18.5 UG/ML (ref 10–22)

## 2025-08-04 PROCEDURE — 80202 ASSAY OF VANCOMYCIN: CPT | Performed by: INTERNAL MEDICINE

## 2025-08-04 PROCEDURE — 80053 COMPREHEN METABOLIC PANEL: CPT

## 2025-08-04 PROCEDURE — 63600175 PHARM REV CODE 636 W HCPCS: Performed by: INTERNAL MEDICINE

## 2025-08-04 PROCEDURE — 36415 COLL VENOUS BLD VENIPUNCTURE: CPT

## 2025-08-04 PROCEDURE — 99233 SBSQ HOSP IP/OBS HIGH 50: CPT | Mod: ,,, | Performed by: REGISTERED NURSE

## 2025-08-04 PROCEDURE — 11000001 HC ACUTE MED/SURG PRIVATE ROOM

## 2025-08-04 PROCEDURE — 25000003 PHARM REV CODE 250: Performed by: INTERNAL MEDICINE

## 2025-08-04 PROCEDURE — 97116 GAIT TRAINING THERAPY: CPT | Mod: CQ

## 2025-08-04 PROCEDURE — 63600175 PHARM REV CODE 636 W HCPCS

## 2025-08-04 PROCEDURE — 63600175 PHARM REV CODE 636 W HCPCS: Performed by: PHYSICIAN ASSISTANT

## 2025-08-04 PROCEDURE — 25000003 PHARM REV CODE 250

## 2025-08-04 PROCEDURE — 25000003 PHARM REV CODE 250: Performed by: NURSE PRACTITIONER

## 2025-08-04 RX ORDER — CHLORHEXIDINE GLUCONATE ORAL RINSE 1.2 MG/ML
15 SOLUTION DENTAL 2 TIMES DAILY
Status: DISCONTINUED | OUTPATIENT
Start: 2025-08-04 | End: 2025-08-07 | Stop reason: HOSPADM

## 2025-08-04 RX ORDER — POLYETHYLENE GLYCOL 3350 17 G/17G
17 POWDER, FOR SOLUTION ORAL 2 TIMES DAILY
Status: DISCONTINUED | OUTPATIENT
Start: 2025-08-04 | End: 2025-08-07 | Stop reason: HOSPADM

## 2025-08-04 RX ORDER — MUPIROCIN 20 MG/G
OINTMENT TOPICAL 2 TIMES DAILY
Status: DISCONTINUED | OUTPATIENT
Start: 2025-08-04 | End: 2025-08-07 | Stop reason: HOSPADM

## 2025-08-04 RX ORDER — AMOXICILLIN 250 MG
1 CAPSULE ORAL 2 TIMES DAILY
Status: DISCONTINUED | OUTPATIENT
Start: 2025-08-04 | End: 2025-08-07 | Stop reason: HOSPADM

## 2025-08-04 RX ADMIN — OXYCODONE HYDROCHLORIDE 10 MG: 10 TABLET, FILM COATED, EXTENDED RELEASE ORAL at 08:08

## 2025-08-04 RX ADMIN — METHOCARBAMOL 500 MG: 500 TABLET ORAL at 08:08

## 2025-08-04 RX ADMIN — HYDROMORPHONE HYDROCHLORIDE 1 MG: 1 INJECTION, SOLUTION INTRAMUSCULAR; INTRAVENOUS; SUBCUTANEOUS at 04:08

## 2025-08-04 RX ADMIN — POLYETHYLENE GLYCOL 3350 17 G: 17 POWDER, FOR SOLUTION ORAL at 09:08

## 2025-08-04 RX ADMIN — MUPIROCIN: 20 OINTMENT TOPICAL at 12:08

## 2025-08-04 RX ADMIN — LIDOCAINE 1 PATCH: 50 PATCH CUTANEOUS at 12:08

## 2025-08-04 RX ADMIN — CEFTRIAXONE SODIUM 2 G: 2 INJECTION, POWDER, FOR SOLUTION INTRAMUSCULAR; INTRAVENOUS at 09:08

## 2025-08-04 RX ADMIN — METHOCARBAMOL 500 MG: 500 TABLET ORAL at 03:08

## 2025-08-04 RX ADMIN — GABAPENTIN 300 MG: 300 CAPSULE ORAL at 03:08

## 2025-08-04 RX ADMIN — ASPIRIN 81 MG: 81 TABLET, DELAYED RELEASE ORAL at 08:08

## 2025-08-04 RX ADMIN — OXYCODONE HYDROCHLORIDE 10 MG: 10 TABLET ORAL at 01:08

## 2025-08-04 RX ADMIN — OXYCODONE HYDROCHLORIDE 10 MG: 10 TABLET ORAL at 11:08

## 2025-08-04 RX ADMIN — VANCOMYCIN HYDROCHLORIDE 1500 MG: 1.5 INJECTION, POWDER, LYOPHILIZED, FOR SOLUTION INTRAVENOUS at 01:08

## 2025-08-04 RX ADMIN — VANCOMYCIN HYDROCHLORIDE 1500 MG: 1.5 INJECTION, POWDER, LYOPHILIZED, FOR SOLUTION INTRAVENOUS at 06:08

## 2025-08-04 RX ADMIN — OXYCODONE HYDROCHLORIDE 10 MG: 10 TABLET, FILM COATED, EXTENDED RELEASE ORAL at 09:08

## 2025-08-04 RX ADMIN — HYDROMORPHONE HYDROCHLORIDE 1 MG: 1 INJECTION, SOLUTION INTRAMUSCULAR; INTRAVENOUS; SUBCUTANEOUS at 07:08

## 2025-08-04 RX ADMIN — VANCOMYCIN HYDROCHLORIDE 1500 MG: 1.5 INJECTION, POWDER, LYOPHILIZED, FOR SOLUTION INTRAVENOUS at 08:08

## 2025-08-04 RX ADMIN — HYDROMORPHONE HYDROCHLORIDE 1 MG: 1 INJECTION, SOLUTION INTRAMUSCULAR; INTRAVENOUS; SUBCUTANEOUS at 01:08

## 2025-08-04 RX ADMIN — METHOCARBAMOL 500 MG: 500 TABLET ORAL at 09:08

## 2025-08-04 RX ADMIN — GABAPENTIN 300 MG: 300 CAPSULE ORAL at 09:08

## 2025-08-04 RX ADMIN — CLOTRIMAZOLE 1 APPLICATOR: 1 CREAM VAGINAL at 09:08

## 2025-08-04 RX ADMIN — SENNOSIDES AND DOCUSATE SODIUM 1 TABLET: 50; 8.6 TABLET ORAL at 09:08

## 2025-08-04 RX ADMIN — GABAPENTIN 300 MG: 300 CAPSULE ORAL at 08:08

## 2025-08-04 RX ADMIN — AMLODIPINE BESYLATE 10 MG: 10 TABLET ORAL at 08:08

## 2025-08-04 RX ADMIN — ATORVASTATIN CALCIUM 40 MG: 40 TABLET, FILM COATED ORAL at 09:08

## 2025-08-04 RX ADMIN — OXYCODONE HYDROCHLORIDE 10 MG: 10 TABLET ORAL at 06:08

## 2025-08-04 RX ADMIN — INSULIN ASPART 1 UNITS: 100 INJECTION, SOLUTION INTRAVENOUS; SUBCUTANEOUS at 09:08

## 2025-08-04 RX ADMIN — ACETAMINOPHEN 650 MG: 325 TABLET ORAL at 04:08

## 2025-08-04 RX ADMIN — MUPIROCIN: 20 OINTMENT TOPICAL at 09:08

## 2025-08-05 PROBLEM — D64.9 ANEMIA: Status: ACTIVE | Noted: 2025-08-05

## 2025-08-05 PROBLEM — E66.01 MORBID OBESITY: Status: ACTIVE | Noted: 2025-08-05

## 2025-08-05 LAB
ABSOLUTE EOSINOPHIL (OHS): 0.27 K/UL
ABSOLUTE MONOCYTE (OHS): 0.73 K/UL (ref 0.3–1)
ABSOLUTE NEUTROPHIL COUNT (OHS): 4.58 K/UL (ref 1.8–7.7)
ALBUMIN SERPL BCP-MCNC: 3.3 G/DL (ref 3.5–5.2)
ALP SERPL-CCNC: 67 UNIT/L (ref 40–150)
ALT SERPL W/O P-5'-P-CCNC: 14 UNIT/L (ref 0–55)
ANION GAP (OHS): 8 MMOL/L (ref 8–16)
AST SERPL-CCNC: 12 UNIT/L (ref 0–50)
B-HCG UR QL: NEGATIVE
BACTERIA BLD CULT: NORMAL
BACTERIA BLD CULT: NORMAL
BACTERIA SPEC AEROBE CULT: NO GROWTH
BASOPHILS # BLD AUTO: 0.04 K/UL
BASOPHILS NFR BLD AUTO: 0.5 %
BILIRUB SERPL-MCNC: 0.3 MG/DL (ref 0.1–1)
BUN SERPL-MCNC: 11 MG/DL (ref 6–20)
CALCIUM SERPL-MCNC: 9.4 MG/DL (ref 8.7–10.5)
CHLORIDE SERPL-SCNC: 104 MMOL/L (ref 95–110)
CO2 SERPL-SCNC: 25 MMOL/L (ref 23–29)
CREAT SERPL-MCNC: 0.7 MG/DL (ref 0.5–1.4)
ERYTHROCYTE [DISTWIDTH] IN BLOOD BY AUTOMATED COUNT: 14.7 % (ref 11.5–14.5)
GFR SERPLBLD CREATININE-BSD FMLA CKD-EPI: >60 ML/MIN/1.73/M2
GLUCOSE SERPL-MCNC: 124 MG/DL (ref 70–110)
GRAM STN SPEC: NORMAL
GRAM STN SPEC: NORMAL
HCT VFR BLD AUTO: 35.3 % (ref 37–48.5)
HGB BLD-MCNC: 11.3 GM/DL (ref 12–16)
IMM GRANULOCYTES # BLD AUTO: 0.03 K/UL (ref 0–0.04)
IMM GRANULOCYTES NFR BLD AUTO: 0.4 % (ref 0–0.5)
LYMPHOCYTES # BLD AUTO: 1.72 K/UL (ref 1–4.8)
MCH RBC QN AUTO: 25.1 PG (ref 27–31)
MCHC RBC AUTO-ENTMCNC: 32 G/DL (ref 32–36)
MCV RBC AUTO: 78 FL (ref 82–98)
NUCLEATED RBC (/100WBC) (OHS): 0 /100 WBC
PLATELET # BLD AUTO: 459 K/UL (ref 150–450)
PMV BLD AUTO: 9 FL (ref 9.2–12.9)
POCT GLUCOSE: 133 MG/DL (ref 70–110)
POCT GLUCOSE: 143 MG/DL (ref 70–110)
POCT GLUCOSE: 151 MG/DL (ref 70–110)
POTASSIUM SERPL-SCNC: 3.9 MMOL/L (ref 3.5–5.1)
PROT SERPL-MCNC: 7.5 GM/DL (ref 6–8.4)
RBC # BLD AUTO: 4.5 M/UL (ref 4–5.4)
RELATIVE EOSINOPHIL (OHS): 3.7 %
RELATIVE LYMPHOCYTE (OHS): 23.3 % (ref 18–48)
RELATIVE MONOCYTE (OHS): 9.9 % (ref 4–15)
RELATIVE NEUTROPHIL (OHS): 62.2 % (ref 38–73)
SODIUM SERPL-SCNC: 137 MMOL/L (ref 136–145)
WBC # BLD AUTO: 7.37 K/UL (ref 3.9–12.7)

## 2025-08-05 PROCEDURE — 87102 FUNGUS ISOLATION CULTURE: CPT

## 2025-08-05 PROCEDURE — 36415 COLL VENOUS BLD VENIPUNCTURE: CPT

## 2025-08-05 PROCEDURE — 63600175 PHARM REV CODE 636 W HCPCS: Performed by: RADIOLOGY

## 2025-08-05 PROCEDURE — 25000003 PHARM REV CODE 250: Performed by: INTERNAL MEDICINE

## 2025-08-05 PROCEDURE — 25000003 PHARM REV CODE 250

## 2025-08-05 PROCEDURE — 63600175 PHARM REV CODE 636 W HCPCS: Performed by: PHYSICIAN ASSISTANT

## 2025-08-05 PROCEDURE — 63600175 PHARM REV CODE 636 W HCPCS: Performed by: INTERNAL MEDICINE

## 2025-08-05 PROCEDURE — 87075 CULTR BACTERIA EXCEPT BLOOD: CPT

## 2025-08-05 PROCEDURE — 63600175 PHARM REV CODE 636 W HCPCS

## 2025-08-05 PROCEDURE — 87205 SMEAR GRAM STAIN: CPT

## 2025-08-05 PROCEDURE — 97535 SELF CARE MNGMENT TRAINING: CPT

## 2025-08-05 PROCEDURE — 11000001 HC ACUTE MED/SURG PRIVATE ROOM

## 2025-08-05 PROCEDURE — 97110 THERAPEUTIC EXERCISES: CPT

## 2025-08-05 PROCEDURE — 87070 CULTURE OTHR SPECIMN AEROBIC: CPT

## 2025-08-05 PROCEDURE — 85025 COMPLETE CBC W/AUTO DIFF WBC: CPT

## 2025-08-05 PROCEDURE — 25000003 PHARM REV CODE 250: Performed by: NURSE PRACTITIONER

## 2025-08-05 PROCEDURE — 0Q913ZX DRAINAGE OF SACRUM, PERCUTANEOUS APPROACH, DIAGNOSTIC: ICD-10-PCS | Performed by: RADIOLOGY

## 2025-08-05 PROCEDURE — 81025 URINE PREGNANCY TEST: CPT | Performed by: STUDENT IN AN ORGANIZED HEALTH CARE EDUCATION/TRAINING PROGRAM

## 2025-08-05 PROCEDURE — 88311 DECALCIFY TISSUE: CPT | Mod: TC | Performed by: INTERNAL MEDICINE

## 2025-08-05 PROCEDURE — 99233 SBSQ HOSP IP/OBS HIGH 50: CPT | Mod: ,,, | Performed by: REGISTERED NURSE

## 2025-08-05 RX ORDER — MIDAZOLAM HYDROCHLORIDE 1 MG/ML
INJECTION, SOLUTION INTRAMUSCULAR; INTRAVENOUS
Status: DISCONTINUED | OUTPATIENT
Start: 2025-08-05 | End: 2025-08-06

## 2025-08-05 RX ORDER — FENTANYL CITRATE 50 UG/ML
INJECTION, SOLUTION INTRAMUSCULAR; INTRAVENOUS
Status: DISCONTINUED | OUTPATIENT
Start: 2025-08-05 | End: 2025-08-06

## 2025-08-05 RX ORDER — LIDOCAINE HYDROCHLORIDE 10 MG/ML
INJECTION, SOLUTION INFILTRATION; PERINEURAL
Status: DISCONTINUED | OUTPATIENT
Start: 2025-08-05 | End: 2025-08-06

## 2025-08-05 RX ORDER — ONDANSETRON HYDROCHLORIDE 2 MG/ML
4 INJECTION, SOLUTION INTRAVENOUS EVERY 6 HOURS PRN
Status: DISCONTINUED | OUTPATIENT
Start: 2025-08-05 | End: 2025-08-07 | Stop reason: HOSPADM

## 2025-08-05 RX ADMIN — FENTANYL CITRATE 25 MCG: 50 INJECTION, SOLUTION INTRAMUSCULAR; INTRAVENOUS at 04:08

## 2025-08-05 RX ADMIN — CLOTRIMAZOLE 1 APPLICATOR: 1 CREAM VAGINAL at 09:08

## 2025-08-05 RX ADMIN — OXYCODONE HYDROCHLORIDE 10 MG: 10 TABLET ORAL at 07:08

## 2025-08-05 RX ADMIN — OXYCODONE HYDROCHLORIDE 10 MG: 10 TABLET, FILM COATED, EXTENDED RELEASE ORAL at 09:08

## 2025-08-05 RX ADMIN — LIDOCAINE HYDROCHLORIDE 5 ML: 10 INJECTION, SOLUTION INFILTRATION; PERINEURAL at 04:08

## 2025-08-05 RX ADMIN — GABAPENTIN 300 MG: 300 CAPSULE ORAL at 09:08

## 2025-08-05 RX ADMIN — SENNOSIDES AND DOCUSATE SODIUM 1 TABLET: 50; 8.6 TABLET ORAL at 09:08

## 2025-08-05 RX ADMIN — MIDAZOLAM HYDROCHLORIDE 2 MG: 2 INJECTION, SOLUTION INTRAMUSCULAR; INTRAVENOUS at 04:08

## 2025-08-05 RX ADMIN — POLYETHYLENE GLYCOL 3350 17 G: 17 POWDER, FOR SOLUTION ORAL at 09:08

## 2025-08-05 RX ADMIN — VANCOMYCIN HYDROCHLORIDE 1500 MG: 1.5 INJECTION, POWDER, LYOPHILIZED, FOR SOLUTION INTRAVENOUS at 06:08

## 2025-08-05 RX ADMIN — MIDAZOLAM HYDROCHLORIDE 1 MG: 2 INJECTION, SOLUTION INTRAMUSCULAR; INTRAVENOUS at 04:08

## 2025-08-05 RX ADMIN — HYDROMORPHONE HYDROCHLORIDE 1 MG: 1 INJECTION, SOLUTION INTRAMUSCULAR; INTRAVENOUS; SUBCUTANEOUS at 10:08

## 2025-08-05 RX ADMIN — CEFTRIAXONE SODIUM 2 G: 2 INJECTION, POWDER, FOR SOLUTION INTRAMUSCULAR; INTRAVENOUS at 09:08

## 2025-08-05 RX ADMIN — METHOCARBAMOL 500 MG: 500 TABLET ORAL at 09:08

## 2025-08-05 RX ADMIN — VANCOMYCIN HYDROCHLORIDE 1500 MG: 1.5 INJECTION, POWDER, LYOPHILIZED, FOR SOLUTION INTRAVENOUS at 01:08

## 2025-08-05 RX ADMIN — ATORVASTATIN CALCIUM 40 MG: 40 TABLET, FILM COATED ORAL at 09:08

## 2025-08-05 RX ADMIN — OXYCODONE HYDROCHLORIDE 10 MG: 10 TABLET ORAL at 05:08

## 2025-08-05 RX ADMIN — VANCOMYCIN HYDROCHLORIDE 1500 MG: 1.5 INJECTION, POWDER, LYOPHILIZED, FOR SOLUTION INTRAVENOUS at 10:08

## 2025-08-05 RX ADMIN — CHLORHEXIDINE GLUCONATE 0.12% ORAL RINSE 15 ML: 1.2 LIQUID ORAL at 09:08

## 2025-08-05 RX ADMIN — AMLODIPINE BESYLATE 10 MG: 10 TABLET ORAL at 09:08

## 2025-08-05 RX ADMIN — FENTANYL CITRATE 50 MCG: 50 INJECTION, SOLUTION INTRAMUSCULAR; INTRAVENOUS at 04:08

## 2025-08-05 RX ADMIN — HYDROMORPHONE HYDROCHLORIDE 1 MG: 1 INJECTION, SOLUTION INTRAMUSCULAR; INTRAVENOUS; SUBCUTANEOUS at 01:08

## 2025-08-06 VITALS
DIASTOLIC BLOOD PRESSURE: 76 MMHG | BODY MASS INDEX: 45.99 KG/M2 | RESPIRATION RATE: 20 BRPM | SYSTOLIC BLOOD PRESSURE: 144 MMHG | WEIGHT: 293 LBS | TEMPERATURE: 99 F | HEIGHT: 67 IN | HEART RATE: 81 BPM | OXYGEN SATURATION: 99 %

## 2025-08-06 LAB
ABSOLUTE EOSINOPHIL (OHS): 0.25 K/UL
ABSOLUTE MONOCYTE (OHS): 0.63 K/UL (ref 0.3–1)
ABSOLUTE NEUTROPHIL COUNT (OHS): 4.3 K/UL (ref 1.8–7.7)
BACTERIA SPEC ANAEROBE CULT: NORMAL
BASOPHILS # BLD AUTO: 0.05 K/UL
BASOPHILS NFR BLD AUTO: 0.7 %
ERYTHROCYTE [DISTWIDTH] IN BLOOD BY AUTOMATED COUNT: 14.6 % (ref 11.5–14.5)
FUNGUS SPEC CULT: NORMAL
HCT VFR BLD AUTO: 33.9 % (ref 37–48.5)
HGB BLD-MCNC: 11.1 GM/DL (ref 12–16)
IMM GRANULOCYTES # BLD AUTO: 0.03 K/UL (ref 0–0.04)
IMM GRANULOCYTES NFR BLD AUTO: 0.4 % (ref 0–0.5)
LYMPHOCYTES # BLD AUTO: 1.45 K/UL (ref 1–4.8)
MCH RBC QN AUTO: 25.6 PG (ref 27–31)
MCHC RBC AUTO-ENTMCNC: 32.7 G/DL (ref 32–36)
MCV RBC AUTO: 78 FL (ref 82–98)
NUCLEATED RBC (/100WBC) (OHS): 0 /100 WBC
PLATELET # BLD AUTO: 429 K/UL (ref 150–450)
PMV BLD AUTO: 9 FL (ref 9.2–12.9)
RBC # BLD AUTO: 4.34 M/UL (ref 4–5.4)
RELATIVE EOSINOPHIL (OHS): 3.7 %
RELATIVE LYMPHOCYTE (OHS): 21.6 % (ref 18–48)
RELATIVE MONOCYTE (OHS): 9.4 % (ref 4–15)
RELATIVE NEUTROPHIL (OHS): 64.2 % (ref 38–73)
WBC # BLD AUTO: 6.71 K/UL (ref 3.9–12.7)

## 2025-08-06 PROCEDURE — 99233 SBSQ HOSP IP/OBS HIGH 50: CPT | Mod: ,,, | Performed by: REGISTERED NURSE

## 2025-08-06 PROCEDURE — 97535 SELF CARE MNGMENT TRAINING: CPT

## 2025-08-06 PROCEDURE — 25000003 PHARM REV CODE 250

## 2025-08-06 PROCEDURE — 97530 THERAPEUTIC ACTIVITIES: CPT

## 2025-08-06 PROCEDURE — 63600175 PHARM REV CODE 636 W HCPCS

## 2025-08-06 PROCEDURE — 97110 THERAPEUTIC EXERCISES: CPT

## 2025-08-06 PROCEDURE — 97116 GAIT TRAINING THERAPY: CPT

## 2025-08-06 PROCEDURE — 63600175 PHARM REV CODE 636 W HCPCS: Performed by: INTERNAL MEDICINE

## 2025-08-06 PROCEDURE — 85025 COMPLETE CBC W/AUTO DIFF WBC: CPT

## 2025-08-06 PROCEDURE — 36415 COLL VENOUS BLD VENIPUNCTURE: CPT

## 2025-08-06 PROCEDURE — 94761 N-INVAS EAR/PLS OXIMETRY MLT: CPT

## 2025-08-06 PROCEDURE — 25000003 PHARM REV CODE 250: Performed by: NURSE PRACTITIONER

## 2025-08-06 PROCEDURE — 25000003 PHARM REV CODE 250: Performed by: INTERNAL MEDICINE

## 2025-08-06 RX ORDER — ASPIRIN 325 MG
1 TABLET, DELAYED RELEASE (ENTERIC COATED) ORAL NIGHTLY
Status: CANCELLED | OUTPATIENT
Start: 2025-08-06 | End: 2025-08-10

## 2025-08-06 RX ORDER — SODIUM CHLORIDE 0.9 % (FLUSH) 0.9 %
10 SYRINGE (ML) INJECTION EVERY 12 HOURS PRN
Status: CANCELLED | OUTPATIENT
Start: 2025-08-06

## 2025-08-06 RX ORDER — GLUCAGON 1 MG
1 KIT INJECTION
Status: CANCELLED | OUTPATIENT
Start: 2025-08-06

## 2025-08-06 RX ORDER — POLYETHYLENE GLYCOL 3350 17 G/17G
17 POWDER, FOR SOLUTION ORAL 2 TIMES DAILY
Status: CANCELLED | OUTPATIENT
Start: 2025-08-06

## 2025-08-06 RX ORDER — GABAPENTIN 300 MG/1
300 CAPSULE ORAL 3 TIMES DAILY
Status: CANCELLED | OUTPATIENT
Start: 2025-08-06

## 2025-08-06 RX ORDER — INSULIN ASPART 100 [IU]/ML
0-5 INJECTION, SOLUTION INTRAVENOUS; SUBCUTANEOUS
Status: CANCELLED | OUTPATIENT
Start: 2025-08-06

## 2025-08-06 RX ORDER — IBUPROFEN 200 MG
16 TABLET ORAL
Status: CANCELLED | OUTPATIENT
Start: 2025-08-06

## 2025-08-06 RX ORDER — AMLODIPINE BESYLATE 10 MG/1
10 TABLET ORAL DAILY
Status: CANCELLED | OUTPATIENT
Start: 2025-08-07

## 2025-08-06 RX ORDER — ACETAMINOPHEN 325 MG/1
650 TABLET ORAL EVERY 6 HOURS PRN
Status: CANCELLED | OUTPATIENT
Start: 2025-08-06

## 2025-08-06 RX ORDER — ENOXAPARIN SODIUM 100 MG/ML
60 INJECTION SUBCUTANEOUS EVERY 12 HOURS
Status: DISCONTINUED | OUTPATIENT
Start: 2025-08-06 | End: 2025-08-07 | Stop reason: HOSPADM

## 2025-08-06 RX ORDER — ENOXAPARIN SODIUM 100 MG/ML
60 INJECTION SUBCUTANEOUS EVERY 12 HOURS
Status: CANCELLED | OUTPATIENT
Start: 2025-08-06

## 2025-08-06 RX ORDER — LIDOCAINE 50 MG/G
1 PATCH TOPICAL
Status: CANCELLED | OUTPATIENT
Start: 2025-08-07

## 2025-08-06 RX ORDER — IBUPROFEN 800 MG/1
800 TABLET, FILM COATED ORAL 3 TIMES DAILY PRN
COMMUNITY

## 2025-08-06 RX ORDER — HYDROMORPHONE HYDROCHLORIDE 1 MG/ML
1 INJECTION, SOLUTION INTRAMUSCULAR; INTRAVENOUS; SUBCUTANEOUS EVERY 6 HOURS PRN
Refills: 0 | Status: CANCELLED | OUTPATIENT
Start: 2025-08-06

## 2025-08-06 RX ORDER — OXYCODONE HCL 10 MG/1
10 TABLET, FILM COATED, EXTENDED RELEASE ORAL EVERY 12 HOURS
Refills: 0 | Status: CANCELLED | OUTPATIENT
Start: 2025-08-06

## 2025-08-06 RX ORDER — ATORVASTATIN CALCIUM 40 MG/1
40 TABLET, FILM COATED ORAL DAILY
Status: CANCELLED | OUTPATIENT
Start: 2025-08-07

## 2025-08-06 RX ORDER — ONDANSETRON HYDROCHLORIDE 2 MG/ML
4 INJECTION, SOLUTION INTRAVENOUS EVERY 6 HOURS PRN
Status: CANCELLED | OUTPATIENT
Start: 2025-08-06

## 2025-08-06 RX ORDER — MUPIROCIN 20 MG/G
OINTMENT TOPICAL 2 TIMES DAILY
Status: CANCELLED | OUTPATIENT
Start: 2025-08-06 | End: 2025-08-09

## 2025-08-06 RX ORDER — CHLORHEXIDINE GLUCONATE ORAL RINSE 1.2 MG/ML
15 SOLUTION DENTAL 2 TIMES DAILY
Status: CANCELLED | OUTPATIENT
Start: 2025-08-06 | End: 2025-08-09

## 2025-08-06 RX ORDER — INSULIN GLARGINE 100 [IU]/ML
10 INJECTION, SOLUTION SUBCUTANEOUS NIGHTLY
COMMUNITY

## 2025-08-06 RX ORDER — METHOCARBAMOL 500 MG/1
500 TABLET, FILM COATED ORAL 3 TIMES DAILY
Status: CANCELLED | OUTPATIENT
Start: 2025-08-06

## 2025-08-06 RX ORDER — IBUPROFEN 200 MG
24 TABLET ORAL
Status: CANCELLED | OUTPATIENT
Start: 2025-08-06

## 2025-08-06 RX ORDER — OXYCODONE HYDROCHLORIDE 10 MG/1
10 TABLET ORAL EVERY 4 HOURS PRN
Refills: 0 | Status: CANCELLED | OUTPATIENT
Start: 2025-08-06

## 2025-08-06 RX ORDER — AMOXICILLIN 250 MG
1 CAPSULE ORAL 2 TIMES DAILY
Status: CANCELLED | OUTPATIENT
Start: 2025-08-06

## 2025-08-06 RX ORDER — NALOXONE HCL 0.4 MG/ML
0.02 VIAL (ML) INJECTION
Status: CANCELLED | OUTPATIENT
Start: 2025-08-06

## 2025-08-06 RX ADMIN — METHOCARBAMOL 500 MG: 500 TABLET ORAL at 08:08

## 2025-08-06 RX ADMIN — POLYETHYLENE GLYCOL 3350 17 G: 17 POWDER, FOR SOLUTION ORAL at 08:08

## 2025-08-06 RX ADMIN — METHOCARBAMOL 500 MG: 500 TABLET ORAL at 03:08

## 2025-08-06 RX ADMIN — OXYCODONE HYDROCHLORIDE 10 MG: 10 TABLET, FILM COATED, EXTENDED RELEASE ORAL at 08:08

## 2025-08-06 RX ADMIN — GABAPENTIN 300 MG: 300 CAPSULE ORAL at 08:08

## 2025-08-06 RX ADMIN — VANCOMYCIN HYDROCHLORIDE 1500 MG: 1.5 INJECTION, POWDER, LYOPHILIZED, FOR SOLUTION INTRAVENOUS at 10:08

## 2025-08-06 RX ADMIN — HYDROMORPHONE HYDROCHLORIDE 1 MG: 1 INJECTION, SOLUTION INTRAMUSCULAR; INTRAVENOUS; SUBCUTANEOUS at 11:08

## 2025-08-06 RX ADMIN — OXYCODONE HYDROCHLORIDE 10 MG: 10 TABLET ORAL at 01:08

## 2025-08-06 RX ADMIN — OXYCODONE HYDROCHLORIDE 10 MG: 10 TABLET ORAL at 06:08

## 2025-08-06 RX ADMIN — LIDOCAINE 1 PATCH: 50 PATCH CUTANEOUS at 12:08

## 2025-08-06 RX ADMIN — GABAPENTIN 300 MG: 300 CAPSULE ORAL at 03:08

## 2025-08-06 RX ADMIN — SENNOSIDES AND DOCUSATE SODIUM 1 TABLET: 50; 8.6 TABLET ORAL at 08:08

## 2025-08-06 RX ADMIN — ENOXAPARIN SODIUM 60 MG: 60 INJECTION SUBCUTANEOUS at 08:08

## 2025-08-06 RX ADMIN — ATORVASTATIN CALCIUM 40 MG: 40 TABLET, FILM COATED ORAL at 08:08

## 2025-08-06 RX ADMIN — MUPIROCIN: 20 OINTMENT TOPICAL at 08:08

## 2025-08-06 RX ADMIN — CHLORHEXIDINE GLUCONATE 0.12% ORAL RINSE 15 ML: 1.2 LIQUID ORAL at 08:08

## 2025-08-06 RX ADMIN — VANCOMYCIN HYDROCHLORIDE 1500 MG: 1.5 INJECTION, POWDER, LYOPHILIZED, FOR SOLUTION INTRAVENOUS at 01:08

## 2025-08-06 RX ADMIN — AMLODIPINE BESYLATE 10 MG: 10 TABLET ORAL at 08:08

## 2025-08-06 RX ADMIN — HYDROMORPHONE HYDROCHLORIDE 1 MG: 1 INJECTION, SOLUTION INTRAMUSCULAR; INTRAVENOUS; SUBCUTANEOUS at 10:08

## 2025-08-07 ENCOUNTER — HOSPITAL ENCOUNTER (INPATIENT)
Facility: HOSPITAL | Age: 40
LOS: 8 days | Discharge: HOME | DRG: 552 | End: 2025-08-15
Attending: SPECIALIST | Admitting: SPECIALIST
Payer: MEDICAID

## 2025-08-07 DIAGNOSIS — E11.9 TYPE 2 DIABETES MELLITUS WITHOUT COMPLICATION, WITHOUT LONG-TERM CURRENT USE OF INSULIN: Chronic | ICD-10-CM

## 2025-08-07 DIAGNOSIS — E66.01 MORBID OBESITY: ICD-10-CM

## 2025-08-07 DIAGNOSIS — M25.48: ICD-10-CM

## 2025-08-07 DIAGNOSIS — R07.9 CHEST PAIN: ICD-10-CM

## 2025-08-07 DIAGNOSIS — M46.1 SACROILIITIS: Primary | ICD-10-CM

## 2025-08-07 PROBLEM — I10 HYPERTENSION: Chronic | Status: ACTIVE | Noted: 2025-07-12

## 2025-08-07 PROBLEM — E78.5 HYPERLIPIDEMIA: Chronic | Status: ACTIVE | Noted: 2025-07-12

## 2025-08-07 PROBLEM — E66.813 CLASS 3 SEVERE OBESITY DUE TO EXCESS CALORIES WITH SERIOUS COMORBIDITY AND BODY MASS INDEX (BMI) OF 50.0 TO 59.9 IN ADULT: Chronic | Status: ACTIVE | Noted: 2025-08-07

## 2025-08-07 PROBLEM — B37.9 YEAST INFECTION: Status: ACTIVE | Noted: 2025-08-07

## 2025-08-07 PROBLEM — E66.813 CLASS 3 SEVERE OBESITY DUE TO EXCESS CALORIES WITH SERIOUS COMORBIDITY AND BODY MASS INDEX (BMI) OF 50.0 TO 59.9 IN ADULT: Status: ACTIVE | Noted: 2025-08-07

## 2025-08-07 PROBLEM — D64.9 ANEMIA: Chronic | Status: ACTIVE | Noted: 2025-08-05

## 2025-08-07 LAB
ABSOLUTE EOSINOPHIL (OHS): 0.31 K/UL
ABSOLUTE MONOCYTE (OHS): 0.68 K/UL (ref 0.3–1)
ABSOLUTE NEUTROPHIL COUNT (OHS): 5.85 K/UL (ref 1.8–7.7)
ALBUMIN SERPL BCP-MCNC: 3.4 G/DL (ref 3.5–5.2)
ALP SERPL-CCNC: 69 UNIT/L (ref 40–150)
ALT SERPL W/O P-5'-P-CCNC: 14 UNIT/L (ref 10–44)
ANION GAP (OHS): 10 MMOL/L (ref 8–16)
AST SERPL-CCNC: 15 UNIT/L (ref 11–45)
BASOPHILS # BLD AUTO: 0.05 K/UL
BASOPHILS NFR BLD AUTO: 0.5 %
BILIRUB SERPL-MCNC: 0.2 MG/DL (ref 0.1–1)
BUN SERPL-MCNC: 13 MG/DL (ref 6–20)
CALCIUM SERPL-MCNC: 9.8 MG/DL (ref 8.7–10.5)
CHLORIDE SERPL-SCNC: 103 MMOL/L (ref 95–110)
CO2 SERPL-SCNC: 24 MMOL/L (ref 23–29)
CREAT SERPL-MCNC: 0.7 MG/DL (ref 0.5–1.4)
ERYTHROCYTE [DISTWIDTH] IN BLOOD BY AUTOMATED COUNT: 14.6 % (ref 11.5–14.5)
GFR SERPLBLD CREATININE-BSD FMLA CKD-EPI: >60 ML/MIN/1.73/M2
GLUCOSE SERPL-MCNC: 215 MG/DL (ref 70–110)
HCT VFR BLD AUTO: 36.9 % (ref 37–48.5)
HGB BLD-MCNC: 11.7 GM/DL (ref 12–16)
IMM GRANULOCYTES # BLD AUTO: 0.03 K/UL (ref 0–0.04)
IMM GRANULOCYTES NFR BLD AUTO: 0.3 % (ref 0–0.5)
LYMPHOCYTES # BLD AUTO: 2.42 K/UL (ref 1–4.8)
MCH RBC QN AUTO: 24.9 PG (ref 27–31)
MCHC RBC AUTO-ENTMCNC: 31.7 G/DL (ref 32–36)
MCV RBC AUTO: 79 FL (ref 82–98)
NUCLEATED RBC (/100WBC) (OHS): 0 /100 WBC
PLATELET # BLD AUTO: 452 K/UL (ref 150–450)
PMV BLD AUTO: 8.6 FL (ref 9.2–12.9)
POCT GLUCOSE: 166 MG/DL (ref 70–110)
POCT GLUCOSE: 190 MG/DL (ref 70–110)
POCT GLUCOSE: 210 MG/DL (ref 70–110)
POTASSIUM SERPL-SCNC: 3.9 MMOL/L (ref 3.5–5.1)
PROT SERPL-MCNC: 7.4 GM/DL (ref 6–8.4)
RBC # BLD AUTO: 4.7 M/UL (ref 4–5.4)
RELATIVE EOSINOPHIL (OHS): 3.3 %
RELATIVE LYMPHOCYTE (OHS): 25.9 % (ref 18–48)
RELATIVE MONOCYTE (OHS): 7.3 % (ref 4–15)
RELATIVE NEUTROPHIL (OHS): 62.7 % (ref 38–73)
SODIUM SERPL-SCNC: 137 MMOL/L (ref 136–145)
WBC # BLD AUTO: 9.34 K/UL (ref 3.9–12.7)

## 2025-08-07 PROCEDURE — 85025 COMPLETE CBC W/AUTO DIFF WBC: CPT

## 2025-08-07 PROCEDURE — 97161 PT EVAL LOW COMPLEX 20 MIN: CPT

## 2025-08-07 PROCEDURE — 11000001 HC ACUTE MED/SURG PRIVATE ROOM

## 2025-08-07 PROCEDURE — 97116 GAIT TRAINING THERAPY: CPT

## 2025-08-07 PROCEDURE — 97165 OT EVAL LOW COMPLEX 30 MIN: CPT

## 2025-08-07 PROCEDURE — 36415 COLL VENOUS BLD VENIPUNCTURE: CPT

## 2025-08-07 PROCEDURE — 80053 COMPREHEN METABOLIC PANEL: CPT

## 2025-08-07 PROCEDURE — 25000003 PHARM REV CODE 250

## 2025-08-07 PROCEDURE — 99223 1ST HOSP IP/OBS HIGH 75: CPT | Mod: NSCH,,, | Performed by: INTERNAL MEDICINE

## 2025-08-07 PROCEDURE — 63600175 PHARM REV CODE 636 W HCPCS

## 2025-08-07 RX ORDER — OXYCODONE HYDROCHLORIDE 5 MG/1
10 TABLET ORAL EVERY 4 HOURS PRN
Status: DISCONTINUED | OUTPATIENT
Start: 2025-08-07 | End: 2025-08-15

## 2025-08-07 RX ORDER — ACETAMINOPHEN 325 MG/1
650 TABLET ORAL EVERY 6 HOURS PRN
Status: DISCONTINUED | OUTPATIENT
Start: 2025-08-07 | End: 2025-08-15 | Stop reason: HOSPADM

## 2025-08-07 RX ORDER — GABAPENTIN 300 MG/1
300 CAPSULE ORAL 3 TIMES DAILY
Status: DISCONTINUED | OUTPATIENT
Start: 2025-08-07 | End: 2025-08-15 | Stop reason: HOSPADM

## 2025-08-07 RX ORDER — IBUPROFEN 200 MG
16 TABLET ORAL
Status: DISCONTINUED | OUTPATIENT
Start: 2025-08-07 | End: 2025-08-09

## 2025-08-07 RX ORDER — METHOCARBAMOL 500 MG/1
500 TABLET, FILM COATED ORAL 3 TIMES DAILY
Status: DISCONTINUED | OUTPATIENT
Start: 2025-08-07 | End: 2025-08-08

## 2025-08-07 RX ORDER — POLYETHYLENE GLYCOL 3350 17 G/17G
17 POWDER, FOR SOLUTION ORAL 2 TIMES DAILY
Status: DISCONTINUED | OUTPATIENT
Start: 2025-08-07 | End: 2025-08-15 | Stop reason: HOSPADM

## 2025-08-07 RX ORDER — ONDANSETRON HYDROCHLORIDE 2 MG/ML
4 INJECTION, SOLUTION INTRAVENOUS EVERY 6 HOURS PRN
Status: DISCONTINUED | OUTPATIENT
Start: 2025-08-07 | End: 2025-08-15 | Stop reason: HOSPADM

## 2025-08-07 RX ORDER — GLUCAGON 1 MG
1 KIT INJECTION
Status: DISCONTINUED | OUTPATIENT
Start: 2025-08-07 | End: 2025-08-09

## 2025-08-07 RX ORDER — LIDOCAINE 50 MG/G
1 PATCH TOPICAL
Status: DISCONTINUED | OUTPATIENT
Start: 2025-08-07 | End: 2025-08-08

## 2025-08-07 RX ORDER — MUPIROCIN 20 MG/G
OINTMENT TOPICAL 2 TIMES DAILY
Status: DISCONTINUED | OUTPATIENT
Start: 2025-08-07 | End: 2025-08-09

## 2025-08-07 RX ORDER — CHLORHEXIDINE GLUCONATE ORAL RINSE 1.2 MG/ML
15 SOLUTION DENTAL 2 TIMES DAILY
Status: DISPENSED | OUTPATIENT
Start: 2025-08-07 | End: 2025-08-09

## 2025-08-07 RX ORDER — ENOXAPARIN SODIUM 100 MG/ML
60 INJECTION SUBCUTANEOUS EVERY 12 HOURS
Status: DISCONTINUED | OUTPATIENT
Start: 2025-08-07 | End: 2025-08-15 | Stop reason: HOSPADM

## 2025-08-07 RX ORDER — IBUPROFEN 200 MG
24 TABLET ORAL
Status: DISCONTINUED | OUTPATIENT
Start: 2025-08-07 | End: 2025-08-09

## 2025-08-07 RX ORDER — NALOXONE HCL 0.4 MG/ML
0.02 VIAL (ML) INJECTION
Status: DISCONTINUED | OUTPATIENT
Start: 2025-08-07 | End: 2025-08-15 | Stop reason: HOSPADM

## 2025-08-07 RX ORDER — SODIUM CHLORIDE 0.9 % (FLUSH) 0.9 %
10 SYRINGE (ML) INJECTION EVERY 12 HOURS PRN
Status: DISCONTINUED | OUTPATIENT
Start: 2025-08-07 | End: 2025-08-15 | Stop reason: HOSPADM

## 2025-08-07 RX ORDER — AMLODIPINE BESYLATE 10 MG/1
10 TABLET ORAL DAILY
Status: DISCONTINUED | OUTPATIENT
Start: 2025-08-07 | End: 2025-08-15 | Stop reason: HOSPADM

## 2025-08-07 RX ORDER — HYDROMORPHONE HYDROCHLORIDE 1 MG/ML
1 INJECTION, SOLUTION INTRAMUSCULAR; INTRAVENOUS; SUBCUTANEOUS EVERY 6 HOURS PRN
Status: DISCONTINUED | OUTPATIENT
Start: 2025-08-07 | End: 2025-08-15

## 2025-08-07 RX ORDER — INSULIN ASPART 100 [IU]/ML
0-5 INJECTION, SOLUTION INTRAVENOUS; SUBCUTANEOUS
Status: DISCONTINUED | OUTPATIENT
Start: 2025-08-07 | End: 2025-08-09

## 2025-08-07 RX ORDER — ATORVASTATIN CALCIUM 40 MG/1
40 TABLET, FILM COATED ORAL DAILY
Status: DISCONTINUED | OUTPATIENT
Start: 2025-08-07 | End: 2025-08-15 | Stop reason: HOSPADM

## 2025-08-07 RX ORDER — ASPIRIN 325 MG
1 TABLET, DELAYED RELEASE (ENTERIC COATED) ORAL NIGHTLY
Status: DISPENSED | OUTPATIENT
Start: 2025-08-07 | End: 2025-08-11

## 2025-08-07 RX ORDER — AMOXICILLIN 250 MG
1 CAPSULE ORAL 2 TIMES DAILY
Status: DISCONTINUED | OUTPATIENT
Start: 2025-08-07 | End: 2025-08-15 | Stop reason: HOSPADM

## 2025-08-07 RX ORDER — OXYCODONE HCL 10 MG/1
10 TABLET, FILM COATED, EXTENDED RELEASE ORAL EVERY 12 HOURS
Status: DISCONTINUED | OUTPATIENT
Start: 2025-08-07 | End: 2025-08-08

## 2025-08-07 RX ADMIN — OXYCODONE HYDROCHLORIDE 10 MG: 10 TABLET, FILM COATED, EXTENDED RELEASE ORAL at 08:08

## 2025-08-07 RX ADMIN — GABAPENTIN 300 MG: 300 CAPSULE ORAL at 08:08

## 2025-08-07 RX ADMIN — OXYCODONE HYDROCHLORIDE 10 MG: 5 TABLET ORAL at 03:08

## 2025-08-07 RX ADMIN — HYDROMORPHONE HYDROCHLORIDE 1 MG: 1 INJECTION, SOLUTION INTRAMUSCULAR; INTRAVENOUS; SUBCUTANEOUS at 04:08

## 2025-08-07 RX ADMIN — ENOXAPARIN SODIUM 60 MG: 60 INJECTION SUBCUTANEOUS at 08:08

## 2025-08-07 RX ADMIN — ATORVASTATIN CALCIUM 40 MG: 40 TABLET, FILM COATED ORAL at 08:08

## 2025-08-07 RX ADMIN — AMLODIPINE BESYLATE 10 MG: 10 TABLET ORAL at 08:08

## 2025-08-07 RX ADMIN — GABAPENTIN 300 MG: 300 CAPSULE ORAL at 03:08

## 2025-08-07 RX ADMIN — CHLORHEXIDINE GLUCONATE 0.12% ORAL RINSE 15 ML: 1.2 LIQUID ORAL at 08:08

## 2025-08-07 RX ADMIN — HYDROMORPHONE HYDROCHLORIDE 1 MG: 1 INJECTION, SOLUTION INTRAMUSCULAR; INTRAVENOUS; SUBCUTANEOUS at 11:08

## 2025-08-07 RX ADMIN — MUPIROCIN: 20 OINTMENT TOPICAL at 08:08

## 2025-08-07 RX ADMIN — OXYCODONE HYDROCHLORIDE 10 MG: 5 TABLET ORAL at 01:08

## 2025-08-07 RX ADMIN — METHOCARBAMOL 500 MG: 500 TABLET ORAL at 08:08

## 2025-08-07 RX ADMIN — LIDOCAINE 1 PATCH: 50 PATCH TOPICAL at 12:08

## 2025-08-07 RX ADMIN — INSULIN ASPART 2 UNITS: 100 INJECTION, SOLUTION INTRAVENOUS; SUBCUTANEOUS at 04:08

## 2025-08-07 RX ADMIN — GABAPENTIN 300 MG: 300 CAPSULE ORAL at 07:08

## 2025-08-07 RX ADMIN — METHOCARBAMOL 500 MG: 500 TABLET ORAL at 03:08

## 2025-08-07 RX ADMIN — HYDROMORPHONE HYDROCHLORIDE 1 MG: 1 INJECTION, SOLUTION INTRAMUSCULAR; INTRAVENOUS; SUBCUTANEOUS at 10:08

## 2025-08-08 LAB
BACTERIA SPEC AEROBE CULT: NO GROWTH
CREAT SERPL-MCNC: 0.8 MG/DL (ref 0.5–1.4)
GFR SERPLBLD CREATININE-BSD FMLA CKD-EPI: >60 ML/MIN/1.73/M2
POCT GLUCOSE: 150 MG/DL (ref 70–110)
POCT GLUCOSE: 159 MG/DL (ref 70–110)
POCT GLUCOSE: 165 MG/DL (ref 70–110)
POCT GLUCOSE: 166 MG/DL (ref 70–110)

## 2025-08-08 PROCEDURE — 25000003 PHARM REV CODE 250

## 2025-08-08 PROCEDURE — 63600175 PHARM REV CODE 636 W HCPCS: Performed by: STUDENT IN AN ORGANIZED HEALTH CARE EDUCATION/TRAINING PROGRAM

## 2025-08-08 PROCEDURE — 99232 SBSQ HOSP IP/OBS MODERATE 35: CPT | Mod: NSCH,,, | Performed by: INTERNAL MEDICINE

## 2025-08-08 PROCEDURE — 11000001 HC ACUTE MED/SURG PRIVATE ROOM

## 2025-08-08 PROCEDURE — 63600175 PHARM REV CODE 636 W HCPCS

## 2025-08-08 PROCEDURE — 36415 COLL VENOUS BLD VENIPUNCTURE: CPT | Performed by: STUDENT IN AN ORGANIZED HEALTH CARE EDUCATION/TRAINING PROGRAM

## 2025-08-08 PROCEDURE — 25000003 PHARM REV CODE 250: Performed by: INTERNAL MEDICINE

## 2025-08-08 PROCEDURE — 25000003 PHARM REV CODE 250: Performed by: STUDENT IN AN ORGANIZED HEALTH CARE EDUCATION/TRAINING PROGRAM

## 2025-08-08 PROCEDURE — 97116 GAIT TRAINING THERAPY: CPT

## 2025-08-08 PROCEDURE — 82565 ASSAY OF CREATININE: CPT | Performed by: STUDENT IN AN ORGANIZED HEALTH CARE EDUCATION/TRAINING PROGRAM

## 2025-08-08 PROCEDURE — 97530 THERAPEUTIC ACTIVITIES: CPT

## 2025-08-08 PROCEDURE — 63600175 PHARM REV CODE 636 W HCPCS: Performed by: INTERNAL MEDICINE

## 2025-08-08 RX ORDER — METHOCARBAMOL 750 MG/1
750 TABLET, FILM COATED ORAL 3 TIMES DAILY
Status: DISCONTINUED | OUTPATIENT
Start: 2025-08-08 | End: 2025-08-15 | Stop reason: HOSPADM

## 2025-08-08 RX ORDER — OXYCODONE HCL 10 MG/1
10 TABLET, FILM COATED, EXTENDED RELEASE ORAL EVERY 12 HOURS
Refills: 0 | Status: DISCONTINUED | OUTPATIENT
Start: 2025-08-08 | End: 2025-08-15

## 2025-08-08 RX ADMIN — OXYCODONE HYDROCHLORIDE 10 MG: 10 TABLET, FILM COATED, EXTENDED RELEASE ORAL at 09:08

## 2025-08-08 RX ADMIN — DEXTROSE MONOHYDRATE 2500 MG: 5 INJECTION, SOLUTION INTRAVENOUS at 12:08

## 2025-08-08 RX ADMIN — OXYCODONE HYDROCHLORIDE 10 MG: 5 TABLET ORAL at 04:08

## 2025-08-08 RX ADMIN — GABAPENTIN 300 MG: 300 CAPSULE ORAL at 09:08

## 2025-08-08 RX ADMIN — GABAPENTIN 300 MG: 300 CAPSULE ORAL at 04:08

## 2025-08-08 RX ADMIN — VANCOMYCIN HYDROCHLORIDE 1500 MG: 1.5 INJECTION, POWDER, LYOPHILIZED, FOR SOLUTION INTRAVENOUS at 10:08

## 2025-08-08 RX ADMIN — ENOXAPARIN SODIUM 60 MG: 60 INJECTION SUBCUTANEOUS at 09:08

## 2025-08-08 RX ADMIN — PIPERACILLIN SODIUM AND TAZOBACTAM SODIUM 4.5 G: 4; .5 INJECTION, POWDER, FOR SOLUTION INTRAVENOUS at 12:08

## 2025-08-08 RX ADMIN — OXYCODONE HYDROCHLORIDE 10 MG: 5 TABLET ORAL at 01:08

## 2025-08-08 RX ADMIN — OXYCODONE HYDROCHLORIDE 10 MG: 5 TABLET ORAL at 12:08

## 2025-08-08 RX ADMIN — METHOCARBAMOL 500 MG: 500 TABLET ORAL at 09:08

## 2025-08-08 RX ADMIN — HYDROMORPHONE HYDROCHLORIDE 1 MG: 1 INJECTION, SOLUTION INTRAMUSCULAR; INTRAVENOUS; SUBCUTANEOUS at 06:08

## 2025-08-08 RX ADMIN — METHOCARBAMOL 750 MG: 750 TABLET ORAL at 09:08

## 2025-08-08 RX ADMIN — PIPERACILLIN SODIUM AND TAZOBACTAM SODIUM 4.5 G: 4; .5 INJECTION, POWDER, FOR SOLUTION INTRAVENOUS at 10:08

## 2025-08-08 RX ADMIN — MUPIROCIN: 20 OINTMENT TOPICAL at 09:08

## 2025-08-08 RX ADMIN — SENNOSIDES AND DOCUSATE SODIUM 1 TABLET: 50; 8.6 TABLET ORAL at 09:08

## 2025-08-08 RX ADMIN — ATORVASTATIN CALCIUM 40 MG: 40 TABLET, FILM COATED ORAL at 09:08

## 2025-08-08 RX ADMIN — HYDROMORPHONE HYDROCHLORIDE 1 MG: 1 INJECTION, SOLUTION INTRAMUSCULAR; INTRAVENOUS; SUBCUTANEOUS at 09:08

## 2025-08-08 RX ADMIN — OXYCODONE HYDROCHLORIDE 10 MG: 5 TABLET ORAL at 05:08

## 2025-08-08 RX ADMIN — AMLODIPINE BESYLATE 10 MG: 10 TABLET ORAL at 09:08

## 2025-08-08 RX ADMIN — HYDROMORPHONE HYDROCHLORIDE 1 MG: 1 INJECTION, SOLUTION INTRAMUSCULAR; INTRAVENOUS; SUBCUTANEOUS at 01:08

## 2025-08-08 RX ADMIN — OXYCODONE HYDROCHLORIDE 10 MG: 10 TABLET, FILM COATED, EXTENDED RELEASE ORAL at 11:08

## 2025-08-08 RX ADMIN — METHOCARBAMOL 750 MG: 750 TABLET ORAL at 04:08

## 2025-08-08 RX ADMIN — CHLORHEXIDINE GLUCONATE 0.12% ORAL RINSE 15 ML: 1.2 LIQUID ORAL at 09:08

## 2025-08-08 RX ADMIN — CLOTRIMAZOLE 1 APPLICATOR: 1 CREAM VAGINAL at 09:08

## 2025-08-09 LAB
ABSOLUTE EOSINOPHIL (OHS): 0.29 K/UL
ABSOLUTE MONOCYTE (OHS): 0.74 K/UL (ref 0.3–1)
ABSOLUTE NEUTROPHIL COUNT (OHS): 4.84 K/UL (ref 1.8–7.7)
ALBUMIN SERPL BCP-MCNC: 3.3 G/DL (ref 3.5–5.2)
ALP SERPL-CCNC: 69 UNIT/L (ref 40–150)
ALT SERPL W/O P-5'-P-CCNC: 16 UNIT/L (ref 10–44)
ANION GAP (OHS): 9 MMOL/L (ref 8–16)
AST SERPL-CCNC: 11 UNIT/L (ref 11–45)
BASOPHILS # BLD AUTO: 0.06 K/UL
BASOPHILS NFR BLD AUTO: 0.7 %
BILIRUB SERPL-MCNC: 0.3 MG/DL (ref 0.1–1)
BUN SERPL-MCNC: 16 MG/DL (ref 6–20)
CALCIUM SERPL-MCNC: 9.4 MG/DL (ref 8.7–10.5)
CHLORIDE SERPL-SCNC: 104 MMOL/L (ref 95–110)
CO2 SERPL-SCNC: 25 MMOL/L (ref 23–29)
CREAT SERPL-MCNC: 0.9 MG/DL (ref 0.5–1.4)
CRP SERPL-MCNC: 11 MG/L
ERYTHROCYTE [DISTWIDTH] IN BLOOD BY AUTOMATED COUNT: 14.6 % (ref 11.5–14.5)
ERYTHROCYTE [SEDIMENTATION RATE] IN BLOOD: 91 MM/HR
GFR SERPLBLD CREATININE-BSD FMLA CKD-EPI: >60 ML/MIN/1.73/M2
GLUCOSE SERPL-MCNC: 159 MG/DL (ref 70–110)
HCT VFR BLD AUTO: 33.9 % (ref 37–48.5)
HGB BLD-MCNC: 10.8 GM/DL (ref 12–16)
HOLD SPECIMEN: NORMAL
IMM GRANULOCYTES # BLD AUTO: 0.03 K/UL (ref 0–0.04)
IMM GRANULOCYTES NFR BLD AUTO: 0.4 % (ref 0–0.5)
LYMPHOCYTES # BLD AUTO: 2.17 K/UL (ref 1–4.8)
MCH RBC QN AUTO: 25.2 PG (ref 27–31)
MCHC RBC AUTO-ENTMCNC: 31.9 G/DL (ref 32–36)
MCV RBC AUTO: 79 FL (ref 82–98)
NUCLEATED RBC (/100WBC) (OHS): 0 /100 WBC
PLATELET # BLD AUTO: 387 K/UL (ref 150–450)
PMV BLD AUTO: 9 FL (ref 9.2–12.9)
POCT GLUCOSE: 151 MG/DL (ref 70–110)
POCT GLUCOSE: 180 MG/DL (ref 70–110)
POCT GLUCOSE: 201 MG/DL (ref 70–110)
POTASSIUM SERPL-SCNC: 3.8 MMOL/L (ref 3.5–5.1)
PROT SERPL-MCNC: 7 GM/DL (ref 6–8.4)
RBC # BLD AUTO: 4.29 M/UL (ref 4–5.4)
RELATIVE EOSINOPHIL (OHS): 3.6 %
RELATIVE LYMPHOCYTE (OHS): 26.7 % (ref 18–48)
RELATIVE MONOCYTE (OHS): 9.1 % (ref 4–15)
RELATIVE NEUTROPHIL (OHS): 59.5 % (ref 38–73)
SODIUM SERPL-SCNC: 138 MMOL/L (ref 136–145)
VANCOMYCIN TROUGH SERPL-MCNC: 14 UG/ML (ref 10–22)
WBC # BLD AUTO: 8.13 K/UL (ref 3.9–12.7)

## 2025-08-09 PROCEDURE — 97116 GAIT TRAINING THERAPY: CPT

## 2025-08-09 PROCEDURE — 86140 C-REACTIVE PROTEIN: CPT | Performed by: INTERNAL MEDICINE

## 2025-08-09 PROCEDURE — 85652 RBC SED RATE AUTOMATED: CPT | Performed by: INTERNAL MEDICINE

## 2025-08-09 PROCEDURE — 11000001 HC ACUTE MED/SURG PRIVATE ROOM

## 2025-08-09 PROCEDURE — 25000003 PHARM REV CODE 250

## 2025-08-09 PROCEDURE — 63600175 PHARM REV CODE 636 W HCPCS: Performed by: STUDENT IN AN ORGANIZED HEALTH CARE EDUCATION/TRAINING PROGRAM

## 2025-08-09 PROCEDURE — 63600175 PHARM REV CODE 636 W HCPCS

## 2025-08-09 PROCEDURE — 97530 THERAPEUTIC ACTIVITIES: CPT

## 2025-08-09 PROCEDURE — 80202 ASSAY OF VANCOMYCIN: CPT | Performed by: INTERNAL MEDICINE

## 2025-08-09 PROCEDURE — 36415 COLL VENOUS BLD VENIPUNCTURE: CPT

## 2025-08-09 PROCEDURE — 63600175 PHARM REV CODE 636 W HCPCS: Performed by: NURSE PRACTITIONER

## 2025-08-09 PROCEDURE — 25000003 PHARM REV CODE 250: Performed by: INTERNAL MEDICINE

## 2025-08-09 PROCEDURE — 63700000 PHARM REV CODE 250 ALT 637 W/O HCPCS: Performed by: NURSE PRACTITIONER

## 2025-08-09 PROCEDURE — 63600175 PHARM REV CODE 636 W HCPCS: Performed by: INTERNAL MEDICINE

## 2025-08-09 PROCEDURE — 25000003 PHARM REV CODE 250: Performed by: STUDENT IN AN ORGANIZED HEALTH CARE EDUCATION/TRAINING PROGRAM

## 2025-08-09 PROCEDURE — 85025 COMPLETE CBC W/AUTO DIFF WBC: CPT

## 2025-08-09 PROCEDURE — 80053 COMPREHEN METABOLIC PANEL: CPT

## 2025-08-09 RX ORDER — FLUCONAZOLE 100 MG/1
200 TABLET ORAL ONCE
Status: COMPLETED | OUTPATIENT
Start: 2025-08-09 | End: 2025-08-09

## 2025-08-09 RX ORDER — IBUPROFEN 200 MG
16 TABLET ORAL
Status: DISCONTINUED | OUTPATIENT
Start: 2025-08-09 | End: 2025-08-15 | Stop reason: HOSPADM

## 2025-08-09 RX ORDER — INSULIN ASPART 100 [IU]/ML
0-10 INJECTION, SOLUTION INTRAVENOUS; SUBCUTANEOUS
Status: DISCONTINUED | OUTPATIENT
Start: 2025-08-09 | End: 2025-08-15 | Stop reason: HOSPADM

## 2025-08-09 RX ORDER — IBUPROFEN 200 MG
24 TABLET ORAL
Status: DISCONTINUED | OUTPATIENT
Start: 2025-08-09 | End: 2025-08-15 | Stop reason: HOSPADM

## 2025-08-09 RX ORDER — GLUCAGON 1 MG
1 KIT INJECTION
Status: DISCONTINUED | OUTPATIENT
Start: 2025-08-09 | End: 2025-08-15 | Stop reason: HOSPADM

## 2025-08-09 RX ADMIN — VANCOMYCIN HYDROCHLORIDE 1500 MG: 1.5 INJECTION, POWDER, LYOPHILIZED, FOR SOLUTION INTRAVENOUS at 07:08

## 2025-08-09 RX ADMIN — PIPERACILLIN SODIUM AND TAZOBACTAM SODIUM 4.5 G: 4; .5 INJECTION, POWDER, FOR SOLUTION INTRAVENOUS at 08:08

## 2025-08-09 RX ADMIN — OXYCODONE HYDROCHLORIDE 10 MG: 5 TABLET ORAL at 10:08

## 2025-08-09 RX ADMIN — OXYCODONE HYDROCHLORIDE 10 MG: 5 TABLET ORAL at 02:08

## 2025-08-09 RX ADMIN — PIPERACILLIN SODIUM AND TAZOBACTAM SODIUM 4.5 G: 4; .5 INJECTION, POWDER, FOR SOLUTION INTRAVENOUS at 04:08

## 2025-08-09 RX ADMIN — SENNOSIDES AND DOCUSATE SODIUM 1 TABLET: 50; 8.6 TABLET ORAL at 08:08

## 2025-08-09 RX ADMIN — METHOCARBAMOL 750 MG: 750 TABLET ORAL at 08:08

## 2025-08-09 RX ADMIN — CHLORHEXIDINE GLUCONATE 0.12% ORAL RINSE 15 ML: 1.2 LIQUID ORAL at 08:08

## 2025-08-09 RX ADMIN — MUPIROCIN: 20 OINTMENT TOPICAL at 08:08

## 2025-08-09 RX ADMIN — ATORVASTATIN CALCIUM 40 MG: 40 TABLET, FILM COATED ORAL at 08:08

## 2025-08-09 RX ADMIN — ENOXAPARIN SODIUM 60 MG: 60 INJECTION SUBCUTANEOUS at 08:08

## 2025-08-09 RX ADMIN — OXYCODONE HYDROCHLORIDE 10 MG: 10 TABLET, FILM COATED, EXTENDED RELEASE ORAL at 08:08

## 2025-08-09 RX ADMIN — METHOCARBAMOL 750 MG: 750 TABLET ORAL at 02:08

## 2025-08-09 RX ADMIN — AMLODIPINE BESYLATE 10 MG: 10 TABLET ORAL at 08:08

## 2025-08-09 RX ADMIN — FLUCONAZOLE 200 MG: 100 TABLET ORAL at 11:08

## 2025-08-09 RX ADMIN — CLOTRIMAZOLE 1 APPLICATOR: 1 CREAM VAGINAL at 08:08

## 2025-08-09 RX ADMIN — GABAPENTIN 300 MG: 300 CAPSULE ORAL at 02:08

## 2025-08-09 RX ADMIN — HYDROMORPHONE HYDROCHLORIDE 1 MG: 1 INJECTION, SOLUTION INTRAMUSCULAR; INTRAVENOUS; SUBCUTANEOUS at 06:08

## 2025-08-09 RX ADMIN — POLYETHYLENE GLYCOL 3350 17 G: 17 POWDER, FOR SOLUTION ORAL at 08:08

## 2025-08-09 RX ADMIN — HYDROMORPHONE HYDROCHLORIDE 1 MG: 1 INJECTION, SOLUTION INTRAMUSCULAR; INTRAVENOUS; SUBCUTANEOUS at 03:08

## 2025-08-09 RX ADMIN — INSULIN ASPART 2 UNITS: 100 INJECTION, SOLUTION INTRAVENOUS; SUBCUTANEOUS at 11:08

## 2025-08-09 RX ADMIN — VANCOMYCIN HYDROCHLORIDE 1500 MG: 1.5 INJECTION, POWDER, LYOPHILIZED, FOR SOLUTION INTRAVENOUS at 03:08

## 2025-08-09 RX ADMIN — HYDROMORPHONE HYDROCHLORIDE 1 MG: 1 INJECTION, SOLUTION INTRAMUSCULAR; INTRAVENOUS; SUBCUTANEOUS at 12:08

## 2025-08-09 RX ADMIN — GABAPENTIN 300 MG: 300 CAPSULE ORAL at 08:08

## 2025-08-09 RX ADMIN — PIPERACILLIN SODIUM AND TAZOBACTAM SODIUM 4.5 G: 4; .5 INJECTION, POWDER, FOR SOLUTION INTRAVENOUS at 02:08

## 2025-08-09 RX ADMIN — INSULIN ASPART 2 UNITS: 100 INJECTION, SOLUTION INTRAVENOUS; SUBCUTANEOUS at 05:08

## 2025-08-10 LAB
CREAT SERPL-MCNC: 0.7 MG/DL (ref 0.5–1.4)
GFR SERPLBLD CREATININE-BSD FMLA CKD-EPI: >60 ML/MIN/1.73/M2
HOLD SPECIMEN: NORMAL
POCT GLUCOSE: 192 MG/DL (ref 70–110)
POCT GLUCOSE: 224 MG/DL (ref 70–110)
POCT GLUCOSE: 225 MG/DL (ref 70–110)
POCT GLUCOSE: 235 MG/DL (ref 70–110)
VANCOMYCIN TROUGH SERPL-MCNC: 15.2 UG/ML (ref 10–22)

## 2025-08-10 PROCEDURE — 11000001 HC ACUTE MED/SURG PRIVATE ROOM

## 2025-08-10 PROCEDURE — 25000003 PHARM REV CODE 250

## 2025-08-10 PROCEDURE — 80202 ASSAY OF VANCOMYCIN: CPT | Performed by: INTERNAL MEDICINE

## 2025-08-10 PROCEDURE — 82565 ASSAY OF CREATININE: CPT | Performed by: STUDENT IN AN ORGANIZED HEALTH CARE EDUCATION/TRAINING PROGRAM

## 2025-08-10 PROCEDURE — 63600175 PHARM REV CODE 636 W HCPCS: Performed by: STUDENT IN AN ORGANIZED HEALTH CARE EDUCATION/TRAINING PROGRAM

## 2025-08-10 PROCEDURE — 36415 COLL VENOUS BLD VENIPUNCTURE: CPT | Performed by: STUDENT IN AN ORGANIZED HEALTH CARE EDUCATION/TRAINING PROGRAM

## 2025-08-10 PROCEDURE — 63600175 PHARM REV CODE 636 W HCPCS: Performed by: INTERNAL MEDICINE

## 2025-08-10 PROCEDURE — 97116 GAIT TRAINING THERAPY: CPT

## 2025-08-10 PROCEDURE — 25000003 PHARM REV CODE 250: Performed by: INTERNAL MEDICINE

## 2025-08-10 PROCEDURE — 63600175 PHARM REV CODE 636 W HCPCS

## 2025-08-10 PROCEDURE — 25000003 PHARM REV CODE 250: Performed by: STUDENT IN AN ORGANIZED HEALTH CARE EDUCATION/TRAINING PROGRAM

## 2025-08-10 RX ADMIN — METHOCARBAMOL 750 MG: 750 TABLET ORAL at 04:08

## 2025-08-10 RX ADMIN — HYDROMORPHONE HYDROCHLORIDE 1 MG: 1 INJECTION, SOLUTION INTRAMUSCULAR; INTRAVENOUS; SUBCUTANEOUS at 10:08

## 2025-08-10 RX ADMIN — OXYCODONE HYDROCHLORIDE 10 MG: 5 TABLET ORAL at 04:08

## 2025-08-10 RX ADMIN — AMLODIPINE BESYLATE 10 MG: 10 TABLET ORAL at 07:08

## 2025-08-10 RX ADMIN — ENOXAPARIN SODIUM 60 MG: 60 INJECTION SUBCUTANEOUS at 07:08

## 2025-08-10 RX ADMIN — OXYCODONE HYDROCHLORIDE 10 MG: 5 TABLET ORAL at 12:08

## 2025-08-10 RX ADMIN — VANCOMYCIN HYDROCHLORIDE 1500 MG: 1.5 INJECTION, POWDER, LYOPHILIZED, FOR SOLUTION INTRAVENOUS at 01:08

## 2025-08-10 RX ADMIN — INSULIN ASPART 4 UNITS: 100 INJECTION, SOLUTION INTRAVENOUS; SUBCUTANEOUS at 12:08

## 2025-08-10 RX ADMIN — METHOCARBAMOL 750 MG: 750 TABLET ORAL at 07:08

## 2025-08-10 RX ADMIN — INSULIN ASPART 2 UNITS: 100 INJECTION, SOLUTION INTRAVENOUS; SUBCUTANEOUS at 08:08

## 2025-08-10 RX ADMIN — INSULIN ASPART 4 UNITS: 100 INJECTION, SOLUTION INTRAVENOUS; SUBCUTANEOUS at 04:08

## 2025-08-10 RX ADMIN — PIPERACILLIN SODIUM AND TAZOBACTAM SODIUM 4.5 G: 4; .5 INJECTION, POWDER, FOR SOLUTION INTRAVENOUS at 10:08

## 2025-08-10 RX ADMIN — PIPERACILLIN SODIUM AND TAZOBACTAM SODIUM 4.5 G: 4; .5 INJECTION, POWDER, FOR SOLUTION INTRAVENOUS at 04:08

## 2025-08-10 RX ADMIN — VANCOMYCIN HYDROCHLORIDE 1500 MG: 1.5 INJECTION, POWDER, LYOPHILIZED, FOR SOLUTION INTRAVENOUS at 09:08

## 2025-08-10 RX ADMIN — METHOCARBAMOL 750 MG: 750 TABLET ORAL at 08:08

## 2025-08-10 RX ADMIN — OXYCODONE HYDROCHLORIDE 10 MG: 10 TABLET, FILM COATED, EXTENDED RELEASE ORAL at 08:08

## 2025-08-10 RX ADMIN — INSULIN ASPART 2 UNITS: 100 INJECTION, SOLUTION INTRAVENOUS; SUBCUTANEOUS at 05:08

## 2025-08-10 RX ADMIN — GABAPENTIN 300 MG: 300 CAPSULE ORAL at 04:08

## 2025-08-10 RX ADMIN — HYDROMORPHONE HYDROCHLORIDE 1 MG: 1 INJECTION, SOLUTION INTRAMUSCULAR; INTRAVENOUS; SUBCUTANEOUS at 07:08

## 2025-08-10 RX ADMIN — HYDROMORPHONE HYDROCHLORIDE 1 MG: 1 INJECTION, SOLUTION INTRAMUSCULAR; INTRAVENOUS; SUBCUTANEOUS at 01:08

## 2025-08-10 RX ADMIN — POLYETHYLENE GLYCOL 3350 17 G: 17 POWDER, FOR SOLUTION ORAL at 08:08

## 2025-08-10 RX ADMIN — ENOXAPARIN SODIUM 60 MG: 60 INJECTION SUBCUTANEOUS at 08:08

## 2025-08-10 RX ADMIN — PIPERACILLIN SODIUM AND TAZOBACTAM SODIUM 4.5 G: 4; .5 INJECTION, POWDER, FOR SOLUTION INTRAVENOUS at 01:08

## 2025-08-10 RX ADMIN — OXYCODONE HYDROCHLORIDE 10 MG: 10 TABLET, FILM COATED, EXTENDED RELEASE ORAL at 09:08

## 2025-08-10 RX ADMIN — SENNOSIDES AND DOCUSATE SODIUM 1 TABLET: 50; 8.6 TABLET ORAL at 08:08

## 2025-08-10 RX ADMIN — GABAPENTIN 300 MG: 300 CAPSULE ORAL at 08:08

## 2025-08-10 RX ADMIN — ATORVASTATIN CALCIUM 40 MG: 40 TABLET, FILM COATED ORAL at 07:08

## 2025-08-10 RX ADMIN — VANCOMYCIN HYDROCHLORIDE 1500 MG: 1.5 INJECTION, POWDER, LYOPHILIZED, FOR SOLUTION INTRAVENOUS at 05:08

## 2025-08-10 RX ADMIN — GABAPENTIN 300 MG: 300 CAPSULE ORAL at 07:08

## 2025-08-11 LAB
ABSOLUTE EOSINOPHIL (OHS): 0.25 K/UL
ABSOLUTE MONOCYTE (OHS): 0.64 K/UL (ref 0.3–1)
ABSOLUTE NEUTROPHIL COUNT (OHS): 6.07 K/UL (ref 1.8–7.7)
ALBUMIN SERPL BCP-MCNC: 3.3 G/DL (ref 3.5–5.2)
ALP SERPL-CCNC: 70 UNIT/L (ref 40–150)
ALT SERPL W/O P-5'-P-CCNC: 17 UNIT/L (ref 10–44)
ANION GAP (OHS): 8 MMOL/L (ref 8–16)
AST SERPL-CCNC: 12 UNIT/L (ref 11–45)
BASOPHILS # BLD AUTO: 0.05 K/UL
BASOPHILS NFR BLD AUTO: 0.6 %
BILIRUB SERPL-MCNC: 0.3 MG/DL (ref 0.1–1)
BUN SERPL-MCNC: 16 MG/DL (ref 6–20)
CALCIUM SERPL-MCNC: 9.8 MG/DL (ref 8.7–10.5)
CHLORIDE SERPL-SCNC: 103 MMOL/L (ref 95–110)
CO2 SERPL-SCNC: 25 MMOL/L (ref 23–29)
CREAT SERPL-MCNC: 0.8 MG/DL (ref 0.5–1.4)
ERYTHROCYTE [DISTWIDTH] IN BLOOD BY AUTOMATED COUNT: 14.6 % (ref 11.5–14.5)
GFR SERPLBLD CREATININE-BSD FMLA CKD-EPI: >60 ML/MIN/1.73/M2
GLUCOSE SERPL-MCNC: 169 MG/DL (ref 70–110)
HCT VFR BLD AUTO: 35.1 % (ref 37–48.5)
HGB BLD-MCNC: 11.3 GM/DL (ref 12–16)
IMM GRANULOCYTES # BLD AUTO: 0.05 K/UL (ref 0–0.04)
IMM GRANULOCYTES NFR BLD AUTO: 0.6 % (ref 0–0.5)
LYMPHOCYTES # BLD AUTO: 1.85 K/UL (ref 1–4.8)
MCH RBC QN AUTO: 25 PG (ref 27–31)
MCHC RBC AUTO-ENTMCNC: 32.2 G/DL (ref 32–36)
MCV RBC AUTO: 78 FL (ref 82–98)
NUCLEATED RBC (/100WBC) (OHS): 0 /100 WBC
PLATELET # BLD AUTO: 387 K/UL (ref 150–450)
PMV BLD AUTO: 9.5 FL (ref 9.2–12.9)
POCT GLUCOSE: 159 MG/DL (ref 70–110)
POCT GLUCOSE: 176 MG/DL (ref 70–110)
POCT GLUCOSE: 185 MG/DL (ref 70–110)
POCT GLUCOSE: 291 MG/DL (ref 70–110)
POTASSIUM SERPL-SCNC: 3.7 MMOL/L (ref 3.5–5.1)
PROT SERPL-MCNC: 7.1 GM/DL (ref 6–8.4)
RBC # BLD AUTO: 4.52 M/UL (ref 4–5.4)
RELATIVE EOSINOPHIL (OHS): 2.8 %
RELATIVE LYMPHOCYTE (OHS): 20.8 % (ref 18–48)
RELATIVE MONOCYTE (OHS): 7.2 % (ref 4–15)
RELATIVE NEUTROPHIL (OHS): 68 % (ref 38–73)
SODIUM SERPL-SCNC: 136 MMOL/L (ref 136–145)
VANCOMYCIN TROUGH SERPL-MCNC: 16.6 UG/ML (ref 10–22)
WBC # BLD AUTO: 8.91 K/UL (ref 3.9–12.7)

## 2025-08-11 PROCEDURE — 63600175 PHARM REV CODE 636 W HCPCS: Performed by: STUDENT IN AN ORGANIZED HEALTH CARE EDUCATION/TRAINING PROGRAM

## 2025-08-11 PROCEDURE — 11000001 HC ACUTE MED/SURG PRIVATE ROOM

## 2025-08-11 PROCEDURE — 99233 SBSQ HOSP IP/OBS HIGH 50: CPT | Mod: NSCH,,, | Performed by: INTERNAL MEDICINE

## 2025-08-11 PROCEDURE — 85025 COMPLETE CBC W/AUTO DIFF WBC: CPT | Performed by: STUDENT IN AN ORGANIZED HEALTH CARE EDUCATION/TRAINING PROGRAM

## 2025-08-11 PROCEDURE — 25000003 PHARM REV CODE 250

## 2025-08-11 PROCEDURE — 36415 COLL VENOUS BLD VENIPUNCTURE: CPT | Performed by: STUDENT IN AN ORGANIZED HEALTH CARE EDUCATION/TRAINING PROGRAM

## 2025-08-11 PROCEDURE — 63600175 PHARM REV CODE 636 W HCPCS: Performed by: INTERNAL MEDICINE

## 2025-08-11 PROCEDURE — 25000003 PHARM REV CODE 250: Performed by: STUDENT IN AN ORGANIZED HEALTH CARE EDUCATION/TRAINING PROGRAM

## 2025-08-11 PROCEDURE — 36415 COLL VENOUS BLD VENIPUNCTURE: CPT

## 2025-08-11 PROCEDURE — 25000003 PHARM REV CODE 250: Performed by: INTERNAL MEDICINE

## 2025-08-11 PROCEDURE — 80202 ASSAY OF VANCOMYCIN: CPT | Performed by: STUDENT IN AN ORGANIZED HEALTH CARE EDUCATION/TRAINING PROGRAM

## 2025-08-11 PROCEDURE — 63600175 PHARM REV CODE 636 W HCPCS

## 2025-08-11 PROCEDURE — 97116 GAIT TRAINING THERAPY: CPT

## 2025-08-11 PROCEDURE — 82040 ASSAY OF SERUM ALBUMIN: CPT

## 2025-08-11 RX ADMIN — INSULIN ASPART 1 UNITS: 100 INJECTION, SOLUTION INTRAVENOUS; SUBCUTANEOUS at 11:08

## 2025-08-11 RX ADMIN — HYDROMORPHONE HYDROCHLORIDE 1 MG: 1 INJECTION, SOLUTION INTRAMUSCULAR; INTRAVENOUS; SUBCUTANEOUS at 11:08

## 2025-08-11 RX ADMIN — OXYCODONE HYDROCHLORIDE 10 MG: 5 TABLET ORAL at 02:08

## 2025-08-11 RX ADMIN — POLYETHYLENE GLYCOL 3350 17 G: 17 POWDER, FOR SOLUTION ORAL at 09:08

## 2025-08-11 RX ADMIN — VANCOMYCIN HYDROCHLORIDE 1500 MG: 1.5 INJECTION, POWDER, LYOPHILIZED, FOR SOLUTION INTRAVENOUS at 02:08

## 2025-08-11 RX ADMIN — SENNOSIDES AND DOCUSATE SODIUM 1 TABLET: 50; 8.6 TABLET ORAL at 09:08

## 2025-08-11 RX ADMIN — INSULIN ASPART 6 UNITS: 100 INJECTION, SOLUTION INTRAVENOUS; SUBCUTANEOUS at 11:08

## 2025-08-11 RX ADMIN — OXYCODONE HYDROCHLORIDE 10 MG: 10 TABLET, FILM COATED, EXTENDED RELEASE ORAL at 09:08

## 2025-08-11 RX ADMIN — HYDROMORPHONE HYDROCHLORIDE 1 MG: 1 INJECTION, SOLUTION INTRAMUSCULAR; INTRAVENOUS; SUBCUTANEOUS at 04:08

## 2025-08-11 RX ADMIN — HYDROMORPHONE HYDROCHLORIDE 1 MG: 1 INJECTION, SOLUTION INTRAMUSCULAR; INTRAVENOUS; SUBCUTANEOUS at 05:08

## 2025-08-11 RX ADMIN — PIPERACILLIN SODIUM AND TAZOBACTAM SODIUM 4.5 G: 4; .5 INJECTION, POWDER, FOR SOLUTION INTRAVENOUS at 02:08

## 2025-08-11 RX ADMIN — PIPERACILLIN SODIUM AND TAZOBACTAM SODIUM 4.5 G: 4; .5 INJECTION, POWDER, FOR SOLUTION INTRAVENOUS at 04:08

## 2025-08-11 RX ADMIN — ENOXAPARIN SODIUM 60 MG: 60 INJECTION SUBCUTANEOUS at 09:08

## 2025-08-11 RX ADMIN — VANCOMYCIN HYDROCHLORIDE 1500 MG: 1.5 INJECTION, POWDER, LYOPHILIZED, FOR SOLUTION INTRAVENOUS at 10:08

## 2025-08-11 RX ADMIN — AMLODIPINE BESYLATE 10 MG: 10 TABLET ORAL at 09:08

## 2025-08-11 RX ADMIN — METHOCARBAMOL 750 MG: 750 TABLET ORAL at 02:08

## 2025-08-11 RX ADMIN — GABAPENTIN 300 MG: 300 CAPSULE ORAL at 09:08

## 2025-08-11 RX ADMIN — METHOCARBAMOL 750 MG: 750 TABLET ORAL at 09:08

## 2025-08-11 RX ADMIN — GABAPENTIN 300 MG: 300 CAPSULE ORAL at 02:08

## 2025-08-11 RX ADMIN — ATORVASTATIN CALCIUM 40 MG: 40 TABLET, FILM COATED ORAL at 09:08

## 2025-08-11 RX ADMIN — INSULIN ASPART 2 UNITS: 100 INJECTION, SOLUTION INTRAVENOUS; SUBCUTANEOUS at 05:08

## 2025-08-11 RX ADMIN — VANCOMYCIN HYDROCHLORIDE 1500 MG: 1.5 INJECTION, POWDER, LYOPHILIZED, FOR SOLUTION INTRAVENOUS at 05:08

## 2025-08-11 RX ADMIN — PIPERACILLIN SODIUM AND TAZOBACTAM SODIUM 4.5 G: 4; .5 INJECTION, POWDER, FOR SOLUTION INTRAVENOUS at 10:08

## 2025-08-12 LAB
BACTERIA SPEC ANAEROBE CULT: NORMAL
CREAT SERPL-MCNC: 0.7 MG/DL (ref 0.5–1.4)
DHEA SERPL-MCNC: NORMAL
ESTROGEN SERPL-MCNC: NORMAL PG/ML
GFR SERPLBLD CREATININE-BSD FMLA CKD-EPI: >60 ML/MIN/1.73/M2
INSULIN SERPL-ACNC: NORMAL U[IU]/ML
LAB AP CLINICAL INFORMATION: NORMAL
LAB AP GROSS DESCRIPTION: NORMAL
LAB AP PERFORMING LOCATION(S): NORMAL
LAB AP REPORT FOOTNOTES: NORMAL
POCT GLUCOSE: 166 MG/DL (ref 70–110)
POCT GLUCOSE: 192 MG/DL (ref 70–110)
POCT GLUCOSE: 199 MG/DL (ref 70–110)
POCT GLUCOSE: 219 MG/DL (ref 70–110)
T3RU NFR SERPL: NORMAL %

## 2025-08-12 PROCEDURE — 25000003 PHARM REV CODE 250: Performed by: STUDENT IN AN ORGANIZED HEALTH CARE EDUCATION/TRAINING PROGRAM

## 2025-08-12 PROCEDURE — 11000001 HC ACUTE MED/SURG PRIVATE ROOM

## 2025-08-12 PROCEDURE — 82565 ASSAY OF CREATININE: CPT | Performed by: STUDENT IN AN ORGANIZED HEALTH CARE EDUCATION/TRAINING PROGRAM

## 2025-08-12 PROCEDURE — 25000003 PHARM REV CODE 250

## 2025-08-12 PROCEDURE — 25000003 PHARM REV CODE 250: Performed by: INTERNAL MEDICINE

## 2025-08-12 PROCEDURE — 63600175 PHARM REV CODE 636 W HCPCS

## 2025-08-12 PROCEDURE — 99233 SBSQ HOSP IP/OBS HIGH 50: CPT | Mod: NSCH,,, | Performed by: INTERNAL MEDICINE

## 2025-08-12 PROCEDURE — 63600175 PHARM REV CODE 636 W HCPCS: Performed by: INTERNAL MEDICINE

## 2025-08-12 PROCEDURE — 63600175 PHARM REV CODE 636 W HCPCS: Performed by: STUDENT IN AN ORGANIZED HEALTH CARE EDUCATION/TRAINING PROGRAM

## 2025-08-12 PROCEDURE — 36415 COLL VENOUS BLD VENIPUNCTURE: CPT | Performed by: STUDENT IN AN ORGANIZED HEALTH CARE EDUCATION/TRAINING PROGRAM

## 2025-08-12 RX ADMIN — OXYCODONE HYDROCHLORIDE 10 MG: 10 TABLET, FILM COATED, EXTENDED RELEASE ORAL at 08:08

## 2025-08-12 RX ADMIN — POLYETHYLENE GLYCOL 3350 17 G: 17 POWDER, FOR SOLUTION ORAL at 08:08

## 2025-08-12 RX ADMIN — ATORVASTATIN CALCIUM 40 MG: 40 TABLET, FILM COATED ORAL at 09:08

## 2025-08-12 RX ADMIN — OXYCODONE HYDROCHLORIDE 10 MG: 5 TABLET ORAL at 01:08

## 2025-08-12 RX ADMIN — VANCOMYCIN HYDROCHLORIDE 1500 MG: 1.5 INJECTION, POWDER, LYOPHILIZED, FOR SOLUTION INTRAVENOUS at 05:08

## 2025-08-12 RX ADMIN — HYDROMORPHONE HYDROCHLORIDE 1 MG: 1 INJECTION, SOLUTION INTRAMUSCULAR; INTRAVENOUS; SUBCUTANEOUS at 10:08

## 2025-08-12 RX ADMIN — GABAPENTIN 300 MG: 300 CAPSULE ORAL at 08:08

## 2025-08-12 RX ADMIN — POLYETHYLENE GLYCOL 3350 17 G: 17 POWDER, FOR SOLUTION ORAL at 09:08

## 2025-08-12 RX ADMIN — ENOXAPARIN SODIUM 60 MG: 60 INJECTION SUBCUTANEOUS at 09:08

## 2025-08-12 RX ADMIN — OXYCODONE HYDROCHLORIDE 10 MG: 5 TABLET ORAL at 04:08

## 2025-08-12 RX ADMIN — METHOCARBAMOL 750 MG: 750 TABLET ORAL at 09:08

## 2025-08-12 RX ADMIN — INSULIN ASPART 2 UNITS: 100 INJECTION, SOLUTION INTRAVENOUS; SUBCUTANEOUS at 06:08

## 2025-08-12 RX ADMIN — GABAPENTIN 300 MG: 300 CAPSULE ORAL at 09:08

## 2025-08-12 RX ADMIN — OXYCODONE HYDROCHLORIDE 10 MG: 5 TABLET ORAL at 07:08

## 2025-08-12 RX ADMIN — PIPERACILLIN SODIUM AND TAZOBACTAM SODIUM 4.5 G: 4; .5 INJECTION, POWDER, FOR SOLUTION INTRAVENOUS at 06:08

## 2025-08-12 RX ADMIN — INSULIN ASPART 2 UNITS: 100 INJECTION, SOLUTION INTRAVENOUS; SUBCUTANEOUS at 11:08

## 2025-08-12 RX ADMIN — INSULIN ASPART 1 UNITS: 100 INJECTION, SOLUTION INTRAVENOUS; SUBCUTANEOUS at 10:08

## 2025-08-12 RX ADMIN — INSULIN ASPART 4 UNITS: 100 INJECTION, SOLUTION INTRAVENOUS; SUBCUTANEOUS at 05:08

## 2025-08-12 RX ADMIN — PIPERACILLIN SODIUM AND TAZOBACTAM SODIUM 4.5 G: 4; .5 INJECTION, POWDER, FOR SOLUTION INTRAVENOUS at 09:08

## 2025-08-12 RX ADMIN — HYDROMORPHONE HYDROCHLORIDE 1 MG: 1 INJECTION, SOLUTION INTRAMUSCULAR; INTRAVENOUS; SUBCUTANEOUS at 06:08

## 2025-08-12 RX ADMIN — OXYCODONE HYDROCHLORIDE 10 MG: 10 TABLET, FILM COATED, EXTENDED RELEASE ORAL at 09:08

## 2025-08-12 RX ADMIN — METHOCARBAMOL 750 MG: 750 TABLET ORAL at 08:08

## 2025-08-12 RX ADMIN — ENOXAPARIN SODIUM 60 MG: 60 INJECTION SUBCUTANEOUS at 08:08

## 2025-08-12 RX ADMIN — VANCOMYCIN HYDROCHLORIDE 1500 MG: 1.5 INJECTION, POWDER, LYOPHILIZED, FOR SOLUTION INTRAVENOUS at 09:08

## 2025-08-12 RX ADMIN — PIPERACILLIN SODIUM AND TAZOBACTAM SODIUM 4.5 G: 4; .5 INJECTION, POWDER, FOR SOLUTION INTRAVENOUS at 11:08

## 2025-08-12 RX ADMIN — HYDROMORPHONE HYDROCHLORIDE 1 MG: 1 INJECTION, SOLUTION INTRAMUSCULAR; INTRAVENOUS; SUBCUTANEOUS at 02:08

## 2025-08-12 RX ADMIN — METHOCARBAMOL 750 MG: 750 TABLET ORAL at 02:08

## 2025-08-12 RX ADMIN — SENNOSIDES AND DOCUSATE SODIUM 1 TABLET: 50; 8.6 TABLET ORAL at 08:08

## 2025-08-12 RX ADMIN — VANCOMYCIN HYDROCHLORIDE 1500 MG: 1.5 INJECTION, POWDER, LYOPHILIZED, FOR SOLUTION INTRAVENOUS at 02:08

## 2025-08-12 RX ADMIN — GABAPENTIN 300 MG: 300 CAPSULE ORAL at 02:08

## 2025-08-12 RX ADMIN — SENNOSIDES AND DOCUSATE SODIUM 1 TABLET: 50; 8.6 TABLET ORAL at 09:08

## 2025-08-13 LAB
ALBUMIN SERPL BCP-MCNC: 3.3 G/DL (ref 3.5–5.2)
ALP SERPL-CCNC: 79 UNIT/L (ref 40–150)
ALT SERPL W/O P-5'-P-CCNC: 19 UNIT/L (ref 10–44)
ANION GAP (OHS): 9 MMOL/L (ref 8–16)
AST SERPL-CCNC: 20 UNIT/L (ref 11–45)
BILIRUB SERPL-MCNC: 0.2 MG/DL (ref 0.1–1)
BUN SERPL-MCNC: 15 MG/DL (ref 6–20)
CALCIUM SERPL-MCNC: 9.1 MG/DL (ref 8.7–10.5)
CHLORIDE SERPL-SCNC: 105 MMOL/L (ref 95–110)
CO2 SERPL-SCNC: 26 MMOL/L (ref 23–29)
CREAT SERPL-MCNC: 0.9 MG/DL (ref 0.5–1.4)
GFR SERPLBLD CREATININE-BSD FMLA CKD-EPI: >60 ML/MIN/1.73/M2
GLUCOSE SERPL-MCNC: 258 MG/DL (ref 70–110)
POCT GLUCOSE: 163 MG/DL (ref 70–110)
POCT GLUCOSE: 204 MG/DL (ref 70–110)
POCT GLUCOSE: 210 MG/DL (ref 70–110)
POCT GLUCOSE: 234 MG/DL (ref 70–110)
POTASSIUM SERPL-SCNC: 3.7 MMOL/L (ref 3.5–5.1)
PROT SERPL-MCNC: 6.8 GM/DL (ref 6–8.4)
SODIUM SERPL-SCNC: 140 MMOL/L (ref 136–145)

## 2025-08-13 PROCEDURE — 25500020 PHARM REV CODE 255: Performed by: EMERGENCY MEDICINE

## 2025-08-13 PROCEDURE — 25000003 PHARM REV CODE 250: Performed by: INTERNAL MEDICINE

## 2025-08-13 PROCEDURE — 25000003 PHARM REV CODE 250

## 2025-08-13 PROCEDURE — 99232 SBSQ HOSP IP/OBS MODERATE 35: CPT | Mod: NSCH,,, | Performed by: INTERNAL MEDICINE

## 2025-08-13 PROCEDURE — 63600175 PHARM REV CODE 636 W HCPCS: Performed by: STUDENT IN AN ORGANIZED HEALTH CARE EDUCATION/TRAINING PROGRAM

## 2025-08-13 PROCEDURE — 36415 COLL VENOUS BLD VENIPUNCTURE: CPT

## 2025-08-13 PROCEDURE — 25000003 PHARM REV CODE 250: Performed by: STUDENT IN AN ORGANIZED HEALTH CARE EDUCATION/TRAINING PROGRAM

## 2025-08-13 PROCEDURE — 63600175 PHARM REV CODE 636 W HCPCS

## 2025-08-13 PROCEDURE — 63600175 PHARM REV CODE 636 W HCPCS: Performed by: INTERNAL MEDICINE

## 2025-08-13 PROCEDURE — A9585 GADOBUTROL INJECTION: HCPCS | Performed by: EMERGENCY MEDICINE

## 2025-08-13 PROCEDURE — 11000001 HC ACUTE MED/SURG PRIVATE ROOM

## 2025-08-13 PROCEDURE — 82040 ASSAY OF SERUM ALBUMIN: CPT

## 2025-08-13 RX ORDER — GADOBUTROL 604.72 MG/ML
10 INJECTION INTRAVENOUS
Status: COMPLETED | OUTPATIENT
Start: 2025-08-13 | End: 2025-08-13

## 2025-08-13 RX ADMIN — SENNOSIDES AND DOCUSATE SODIUM 1 TABLET: 50; 8.6 TABLET ORAL at 08:08

## 2025-08-13 RX ADMIN — OXYCODONE HYDROCHLORIDE 10 MG: 10 TABLET, FILM COATED, EXTENDED RELEASE ORAL at 08:08

## 2025-08-13 RX ADMIN — GABAPENTIN 300 MG: 300 CAPSULE ORAL at 08:08

## 2025-08-13 RX ADMIN — PIPERACILLIN SODIUM AND TAZOBACTAM SODIUM 4.5 G: 4; .5 INJECTION, POWDER, FOR SOLUTION INTRAVENOUS at 02:08

## 2025-08-13 RX ADMIN — OXYCODONE HYDROCHLORIDE 10 MG: 5 TABLET ORAL at 07:08

## 2025-08-13 RX ADMIN — GADOBUTROL 10 ML: 604.72 INJECTION INTRAVENOUS at 06:08

## 2025-08-13 RX ADMIN — OXYCODONE HYDROCHLORIDE 10 MG: 5 TABLET ORAL at 02:08

## 2025-08-13 RX ADMIN — VANCOMYCIN HYDROCHLORIDE 1500 MG: 1.5 INJECTION, POWDER, LYOPHILIZED, FOR SOLUTION INTRAVENOUS at 02:08

## 2025-08-13 RX ADMIN — POLYETHYLENE GLYCOL 3350 17 G: 17 POWDER, FOR SOLUTION ORAL at 08:08

## 2025-08-13 RX ADMIN — PIPERACILLIN SODIUM AND TAZOBACTAM SODIUM 4.5 G: 4; .5 INJECTION, POWDER, FOR SOLUTION INTRAVENOUS at 09:08

## 2025-08-13 RX ADMIN — INSULIN ASPART 2 UNITS: 100 INJECTION, SOLUTION INTRAVENOUS; SUBCUTANEOUS at 05:08

## 2025-08-13 RX ADMIN — HYDROMORPHONE HYDROCHLORIDE 1 MG: 1 INJECTION, SOLUTION INTRAMUSCULAR; INTRAVENOUS; SUBCUTANEOUS at 05:08

## 2025-08-13 RX ADMIN — GABAPENTIN 300 MG: 300 CAPSULE ORAL at 03:08

## 2025-08-13 RX ADMIN — ENOXAPARIN SODIUM 60 MG: 60 INJECTION SUBCUTANEOUS at 09:08

## 2025-08-13 RX ADMIN — OXYCODONE HYDROCHLORIDE 10 MG: 5 TABLET ORAL at 12:08

## 2025-08-13 RX ADMIN — PIPERACILLIN SODIUM AND TAZOBACTAM SODIUM 4.5 G: 4; .5 INJECTION, POWDER, FOR SOLUTION INTRAVENOUS at 05:08

## 2025-08-13 RX ADMIN — HYDROMORPHONE HYDROCHLORIDE 1 MG: 1 INJECTION, SOLUTION INTRAMUSCULAR; INTRAVENOUS; SUBCUTANEOUS at 11:08

## 2025-08-13 RX ADMIN — INSULIN ASPART 4 UNITS: 100 INJECTION, SOLUTION INTRAVENOUS; SUBCUTANEOUS at 05:08

## 2025-08-13 RX ADMIN — METHOCARBAMOL 750 MG: 750 TABLET ORAL at 08:08

## 2025-08-13 RX ADMIN — ENOXAPARIN SODIUM 60 MG: 60 INJECTION SUBCUTANEOUS at 08:08

## 2025-08-13 RX ADMIN — VANCOMYCIN HYDROCHLORIDE 1500 MG: 1.5 INJECTION, POWDER, LYOPHILIZED, FOR SOLUTION INTRAVENOUS at 10:08

## 2025-08-13 RX ADMIN — ATORVASTATIN CALCIUM 40 MG: 40 TABLET, FILM COATED ORAL at 08:08

## 2025-08-13 RX ADMIN — INSULIN ASPART 4 UNITS: 100 INJECTION, SOLUTION INTRAVENOUS; SUBCUTANEOUS at 02:08

## 2025-08-13 RX ADMIN — INSULIN ASPART 2 UNITS: 100 INJECTION, SOLUTION INTRAVENOUS; SUBCUTANEOUS at 09:08

## 2025-08-13 RX ADMIN — VANCOMYCIN HYDROCHLORIDE 1500 MG: 1.5 INJECTION, POWDER, LYOPHILIZED, FOR SOLUTION INTRAVENOUS at 08:08

## 2025-08-13 RX ADMIN — AMLODIPINE BESYLATE 10 MG: 10 TABLET ORAL at 08:08

## 2025-08-13 RX ADMIN — METHOCARBAMOL 750 MG: 750 TABLET ORAL at 03:08

## 2025-08-14 LAB
CREAT SERPL-MCNC: 0.8 MG/DL (ref 0.5–1.4)
GFR SERPLBLD CREATININE-BSD FMLA CKD-EPI: >60 ML/MIN/1.73/M2
POCT GLUCOSE: 153 MG/DL (ref 70–110)
POCT GLUCOSE: 166 MG/DL (ref 70–110)
POCT GLUCOSE: 246 MG/DL (ref 70–110)
POCT GLUCOSE: 266 MG/DL (ref 70–110)
VANCOMYCIN TROUGH SERPL-MCNC: 13.7 UG/ML (ref 10–22)

## 2025-08-14 PROCEDURE — 80202 ASSAY OF VANCOMYCIN: CPT | Performed by: EMERGENCY MEDICINE

## 2025-08-14 PROCEDURE — 25000003 PHARM REV CODE 250: Performed by: STUDENT IN AN ORGANIZED HEALTH CARE EDUCATION/TRAINING PROGRAM

## 2025-08-14 PROCEDURE — 25000003 PHARM REV CODE 250: Performed by: INTERNAL MEDICINE

## 2025-08-14 PROCEDURE — 11000001 HC ACUTE MED/SURG PRIVATE ROOM

## 2025-08-14 PROCEDURE — 63600175 PHARM REV CODE 636 W HCPCS: Performed by: STUDENT IN AN ORGANIZED HEALTH CARE EDUCATION/TRAINING PROGRAM

## 2025-08-14 PROCEDURE — 25000003 PHARM REV CODE 250

## 2025-08-14 PROCEDURE — 63600175 PHARM REV CODE 636 W HCPCS

## 2025-08-14 PROCEDURE — 63600175 PHARM REV CODE 636 W HCPCS: Performed by: INTERNAL MEDICINE

## 2025-08-14 PROCEDURE — 36415 COLL VENOUS BLD VENIPUNCTURE: CPT | Performed by: EMERGENCY MEDICINE

## 2025-08-14 PROCEDURE — 82565 ASSAY OF CREATININE: CPT | Performed by: EMERGENCY MEDICINE

## 2025-08-14 PROCEDURE — 99232 SBSQ HOSP IP/OBS MODERATE 35: CPT | Mod: NSCH,,, | Performed by: INTERNAL MEDICINE

## 2025-08-14 RX ORDER — INSULIN GLARGINE 100 [IU]/ML
5 INJECTION, SOLUTION SUBCUTANEOUS NIGHTLY
Status: DISCONTINUED | OUTPATIENT
Start: 2025-08-14 | End: 2025-08-15 | Stop reason: HOSPADM

## 2025-08-14 RX ADMIN — VANCOMYCIN HYDROCHLORIDE 1500 MG: 1.5 INJECTION, POWDER, LYOPHILIZED, FOR SOLUTION INTRAVENOUS at 04:08

## 2025-08-14 RX ADMIN — HYDROMORPHONE HYDROCHLORIDE 1 MG: 1 INJECTION, SOLUTION INTRAMUSCULAR; INTRAVENOUS; SUBCUTANEOUS at 06:08

## 2025-08-14 RX ADMIN — POLYETHYLENE GLYCOL 3350 17 G: 17 POWDER, FOR SOLUTION ORAL at 09:08

## 2025-08-14 RX ADMIN — HYDROMORPHONE HYDROCHLORIDE 1 MG: 1 INJECTION, SOLUTION INTRAMUSCULAR; INTRAVENOUS; SUBCUTANEOUS at 11:08

## 2025-08-14 RX ADMIN — GABAPENTIN 300 MG: 300 CAPSULE ORAL at 04:08

## 2025-08-14 RX ADMIN — PIPERACILLIN SODIUM AND TAZOBACTAM SODIUM 4.5 G: 4; .5 INJECTION, POWDER, FOR SOLUTION INTRAVENOUS at 04:08

## 2025-08-14 RX ADMIN — OXYCODONE HYDROCHLORIDE 10 MG: 5 TABLET ORAL at 04:08

## 2025-08-14 RX ADMIN — GABAPENTIN 300 MG: 300 CAPSULE ORAL at 10:08

## 2025-08-14 RX ADMIN — VANCOMYCIN HYDROCHLORIDE 1500 MG: 1.5 INJECTION, POWDER, LYOPHILIZED, FOR SOLUTION INTRAVENOUS at 01:08

## 2025-08-14 RX ADMIN — POLYETHYLENE GLYCOL 3350 17 G: 17 POWDER, FOR SOLUTION ORAL at 10:08

## 2025-08-14 RX ADMIN — INSULIN ASPART 2 UNITS: 100 INJECTION, SOLUTION INTRAVENOUS; SUBCUTANEOUS at 06:08

## 2025-08-14 RX ADMIN — ENOXAPARIN SODIUM 60 MG: 60 INJECTION SUBCUTANEOUS at 09:08

## 2025-08-14 RX ADMIN — INSULIN ASPART 4 UNITS: 100 INJECTION, SOLUTION INTRAVENOUS; SUBCUTANEOUS at 12:08

## 2025-08-14 RX ADMIN — METHOCARBAMOL 750 MG: 750 TABLET ORAL at 04:08

## 2025-08-14 RX ADMIN — SENNOSIDES AND DOCUSATE SODIUM 1 TABLET: 50; 8.6 TABLET ORAL at 10:08

## 2025-08-14 RX ADMIN — OXYCODONE HYDROCHLORIDE 10 MG: 5 TABLET ORAL at 07:08

## 2025-08-14 RX ADMIN — HYDROMORPHONE HYDROCHLORIDE 1 MG: 1 INJECTION, SOLUTION INTRAMUSCULAR; INTRAVENOUS; SUBCUTANEOUS at 12:08

## 2025-08-14 RX ADMIN — METHOCARBAMOL 750 MG: 750 TABLET ORAL at 09:08

## 2025-08-14 RX ADMIN — ENOXAPARIN SODIUM 60 MG: 60 INJECTION SUBCUTANEOUS at 10:08

## 2025-08-14 RX ADMIN — SENNOSIDES AND DOCUSATE SODIUM 1 TABLET: 50; 8.6 TABLET ORAL at 09:08

## 2025-08-14 RX ADMIN — INSULIN ASPART 6 UNITS: 100 INJECTION, SOLUTION INTRAVENOUS; SUBCUTANEOUS at 05:08

## 2025-08-14 RX ADMIN — INSULIN GLARGINE 5 UNITS: 100 INJECTION, SOLUTION SUBCUTANEOUS at 09:08

## 2025-08-14 RX ADMIN — ATORVASTATIN CALCIUM 40 MG: 40 TABLET, FILM COATED ORAL at 10:08

## 2025-08-14 RX ADMIN — OXYCODONE HYDROCHLORIDE 10 MG: 10 TABLET, FILM COATED, EXTENDED RELEASE ORAL at 10:08

## 2025-08-14 RX ADMIN — INSULIN ASPART 1 UNITS: 100 INJECTION, SOLUTION INTRAVENOUS; SUBCUTANEOUS at 09:08

## 2025-08-14 RX ADMIN — METHOCARBAMOL 750 MG: 750 TABLET ORAL at 10:08

## 2025-08-14 RX ADMIN — PIPERACILLIN SODIUM AND TAZOBACTAM SODIUM 4.5 G: 4; .5 INJECTION, POWDER, FOR SOLUTION INTRAVENOUS at 09:08

## 2025-08-14 RX ADMIN — GABAPENTIN 300 MG: 300 CAPSULE ORAL at 09:08

## 2025-08-14 RX ADMIN — AMLODIPINE BESYLATE 10 MG: 10 TABLET ORAL at 10:08

## 2025-08-14 RX ADMIN — PIPERACILLIN SODIUM AND TAZOBACTAM SODIUM 4.5 G: 4; .5 INJECTION, POWDER, FOR SOLUTION INTRAVENOUS at 06:08

## 2025-08-14 RX ADMIN — VANCOMYCIN HYDROCHLORIDE 1500 MG: 1.5 INJECTION, POWDER, LYOPHILIZED, FOR SOLUTION INTRAVENOUS at 09:08

## 2025-08-15 VITALS
RESPIRATION RATE: 18 BRPM | HEIGHT: 67 IN | BODY MASS INDEX: 45.99 KG/M2 | SYSTOLIC BLOOD PRESSURE: 115 MMHG | TEMPERATURE: 98 F | HEART RATE: 70 BPM | WEIGHT: 293 LBS | DIASTOLIC BLOOD PRESSURE: 60 MMHG | OXYGEN SATURATION: 97 %

## 2025-08-15 PROBLEM — M46.1 SACROILIITIS: Status: RESOLVED | Noted: 2025-07-15 | Resolved: 2025-08-15

## 2025-08-15 PROBLEM — B37.9 YEAST INFECTION: Status: RESOLVED | Noted: 2025-08-07 | Resolved: 2025-08-15

## 2025-08-15 LAB
ABSOLUTE EOSINOPHIL (OHS): 0.29 K/UL
ABSOLUTE MONOCYTE (OHS): 0.67 K/UL (ref 0.3–1)
ABSOLUTE NEUTROPHIL COUNT (OHS): 4.99 K/UL (ref 1.8–7.7)
ALBUMIN SERPL BCP-MCNC: 3.3 G/DL (ref 3.5–5.2)
ALP SERPL-CCNC: 73 UNIT/L (ref 40–150)
ALT SERPL W/O P-5'-P-CCNC: 19 UNIT/L (ref 10–44)
ANION GAP (OHS): 9 MMOL/L (ref 8–16)
AST SERPL-CCNC: 12 UNIT/L (ref 11–45)
BASOPHILS # BLD AUTO: 0.05 K/UL
BASOPHILS NFR BLD AUTO: 0.6 %
BILIRUB SERPL-MCNC: 0.3 MG/DL (ref 0.1–1)
BUN SERPL-MCNC: 13 MG/DL (ref 6–20)
CALCIUM SERPL-MCNC: 9.3 MG/DL (ref 8.7–10.5)
CHLORIDE SERPL-SCNC: 106 MMOL/L (ref 95–110)
CO2 SERPL-SCNC: 26 MMOL/L (ref 23–29)
CREAT SERPL-MCNC: 0.8 MG/DL (ref 0.5–1.4)
ERYTHROCYTE [DISTWIDTH] IN BLOOD BY AUTOMATED COUNT: 14.7 % (ref 11.5–14.5)
GFR SERPLBLD CREATININE-BSD FMLA CKD-EPI: >60 ML/MIN/1.73/M2
GLUCOSE SERPL-MCNC: 197 MG/DL (ref 70–110)
HCT VFR BLD AUTO: 35.4 % (ref 37–48.5)
HGB BLD-MCNC: 11.5 GM/DL (ref 12–16)
HOLD SPECIMEN: NORMAL
IMM GRANULOCYTES # BLD AUTO: 0.03 K/UL (ref 0–0.04)
IMM GRANULOCYTES NFR BLD AUTO: 0.4 % (ref 0–0.5)
LYMPHOCYTES # BLD AUTO: 2.15 K/UL (ref 1–4.8)
MCH RBC QN AUTO: 25.6 PG (ref 27–31)
MCHC RBC AUTO-ENTMCNC: 32.5 G/DL (ref 32–36)
MCV RBC AUTO: 79 FL (ref 82–98)
NUCLEATED RBC (/100WBC) (OHS): 0 /100 WBC
PLATELET # BLD AUTO: 328 K/UL (ref 150–450)
PMV BLD AUTO: 9.2 FL (ref 9.2–12.9)
POCT GLUCOSE: 154 MG/DL (ref 70–110)
POCT GLUCOSE: 192 MG/DL (ref 70–110)
POTASSIUM SERPL-SCNC: 4 MMOL/L (ref 3.5–5.1)
PROT SERPL-MCNC: 6.8 GM/DL (ref 6–8.4)
RBC # BLD AUTO: 4.5 M/UL (ref 4–5.4)
RELATIVE EOSINOPHIL (OHS): 3.5 %
RELATIVE LYMPHOCYTE (OHS): 26.3 % (ref 18–48)
RELATIVE MONOCYTE (OHS): 8.2 % (ref 4–15)
RELATIVE NEUTROPHIL (OHS): 61 % (ref 38–73)
SODIUM SERPL-SCNC: 141 MMOL/L (ref 136–145)
VANCOMYCIN TROUGH SERPL-MCNC: 14.6 UG/ML (ref 10–22)
WBC # BLD AUTO: 8.18 K/UL (ref 3.9–12.7)

## 2025-08-15 PROCEDURE — 36415 COLL VENOUS BLD VENIPUNCTURE: CPT | Performed by: STUDENT IN AN ORGANIZED HEALTH CARE EDUCATION/TRAINING PROGRAM

## 2025-08-15 PROCEDURE — 25000003 PHARM REV CODE 250

## 2025-08-15 PROCEDURE — 63600175 PHARM REV CODE 636 W HCPCS

## 2025-08-15 PROCEDURE — 85025 COMPLETE CBC W/AUTO DIFF WBC: CPT | Performed by: STUDENT IN AN ORGANIZED HEALTH CARE EDUCATION/TRAINING PROGRAM

## 2025-08-15 PROCEDURE — 80202 ASSAY OF VANCOMYCIN: CPT | Performed by: EMERGENCY MEDICINE

## 2025-08-15 PROCEDURE — 25000003 PHARM REV CODE 250: Performed by: STUDENT IN AN ORGANIZED HEALTH CARE EDUCATION/TRAINING PROGRAM

## 2025-08-15 PROCEDURE — 80053 COMPREHEN METABOLIC PANEL: CPT

## 2025-08-15 PROCEDURE — 63600175 PHARM REV CODE 636 W HCPCS: Performed by: INTERNAL MEDICINE

## 2025-08-15 PROCEDURE — 25000003 PHARM REV CODE 250: Performed by: INTERNAL MEDICINE

## 2025-08-15 PROCEDURE — 63600175 PHARM REV CODE 636 W HCPCS: Performed by: STUDENT IN AN ORGANIZED HEALTH CARE EDUCATION/TRAINING PROGRAM

## 2025-08-15 PROCEDURE — 36415 COLL VENOUS BLD VENIPUNCTURE: CPT

## 2025-08-15 RX ORDER — OXYCODONE AND ACETAMINOPHEN 10; 325 MG/1; MG/1
1 TABLET ORAL EVERY 4 HOURS PRN
Qty: 15 TABLET | Refills: 0 | Status: SHIPPED | OUTPATIENT
Start: 2025-08-15

## 2025-08-15 RX ORDER — METHOCARBAMOL 750 MG/1
750 TABLET, FILM COATED ORAL 3 TIMES DAILY
Qty: 21 TABLET | Refills: 0 | Status: SHIPPED | OUTPATIENT
Start: 2025-08-15 | End: 2025-08-22

## 2025-08-15 RX ORDER — LEVOFLOXACIN 750 MG/1
750 TABLET, FILM COATED ORAL DAILY
Qty: 7 TABLET | Refills: 0 | Status: SHIPPED | OUTPATIENT
Start: 2025-08-15 | End: 2025-08-22

## 2025-08-15 RX ORDER — DOXYCYCLINE 100 MG/1
100 CAPSULE ORAL 2 TIMES DAILY
Qty: 14 CAPSULE | Refills: 0 | Status: SHIPPED | OUTPATIENT
Start: 2025-08-15 | End: 2025-08-22

## 2025-08-15 RX ORDER — IBUPROFEN 200 MG
1 TABLET ORAL DAILY
Qty: 28 PATCH | Refills: 1 | Status: SHIPPED | OUTPATIENT
Start: 2025-08-15

## 2025-08-15 RX ADMIN — OXYCODONE HYDROCHLORIDE 10 MG: 5 TABLET ORAL at 10:08

## 2025-08-15 RX ADMIN — OXYCODONE HYDROCHLORIDE 10 MG: 5 TABLET ORAL at 04:08

## 2025-08-15 RX ADMIN — POLYETHYLENE GLYCOL 3350 17 G: 17 POWDER, FOR SOLUTION ORAL at 09:08

## 2025-08-15 RX ADMIN — VANCOMYCIN HYDROCHLORIDE 1500 MG: 1.5 INJECTION, POWDER, LYOPHILIZED, FOR SOLUTION INTRAVENOUS at 04:08

## 2025-08-15 RX ADMIN — INSULIN ASPART 2 UNITS: 100 INJECTION, SOLUTION INTRAVENOUS; SUBCUTANEOUS at 05:08

## 2025-08-15 RX ADMIN — SENNOSIDES AND DOCUSATE SODIUM 1 TABLET: 50; 8.6 TABLET ORAL at 09:08

## 2025-08-15 RX ADMIN — GABAPENTIN 300 MG: 300 CAPSULE ORAL at 09:08

## 2025-08-15 RX ADMIN — OXYCODONE HYDROCHLORIDE 10 MG: 10 TABLET, FILM COATED, EXTENDED RELEASE ORAL at 09:08

## 2025-08-15 RX ADMIN — INSULIN ASPART 2 UNITS: 100 INJECTION, SOLUTION INTRAVENOUS; SUBCUTANEOUS at 12:08

## 2025-08-15 RX ADMIN — METHOCARBAMOL 750 MG: 750 TABLET ORAL at 09:08

## 2025-08-15 RX ADMIN — ENOXAPARIN SODIUM 60 MG: 60 INJECTION SUBCUTANEOUS at 09:08

## 2025-08-15 RX ADMIN — HYDROMORPHONE HYDROCHLORIDE 1 MG: 1 INJECTION, SOLUTION INTRAMUSCULAR; INTRAVENOUS; SUBCUTANEOUS at 06:08

## 2025-08-15 RX ADMIN — AMLODIPINE BESYLATE 10 MG: 10 TABLET ORAL at 09:08

## 2025-08-15 RX ADMIN — ATORVASTATIN CALCIUM 40 MG: 40 TABLET, FILM COATED ORAL at 09:08

## 2025-08-15 RX ADMIN — PIPERACILLIN SODIUM AND TAZOBACTAM SODIUM 4.5 G: 4; .5 INJECTION, POWDER, FOR SOLUTION INTRAVENOUS at 04:08
